# Patient Record
Sex: FEMALE | Race: WHITE | NOT HISPANIC OR LATINO | Employment: FULL TIME | URBAN - METROPOLITAN AREA
[De-identification: names, ages, dates, MRNs, and addresses within clinical notes are randomized per-mention and may not be internally consistent; named-entity substitution may affect disease eponyms.]

---

## 2018-01-21 ENCOUNTER — OFFICE VISIT (OUTPATIENT)
Dept: URGENT CARE | Facility: URGENT CARE | Age: 33
End: 2018-01-21
Payer: COMMERCIAL

## 2018-01-21 ENCOUNTER — NURSE TRIAGE (OUTPATIENT)
Dept: NURSING | Facility: CLINIC | Age: 33
End: 2018-01-21

## 2018-01-21 VITALS
TEMPERATURE: 99.4 F | WEIGHT: 164 LBS | BODY MASS INDEX: 22.21 KG/M2 | SYSTOLIC BLOOD PRESSURE: 147 MMHG | OXYGEN SATURATION: 97 % | HEIGHT: 72 IN | HEART RATE: 90 BPM | DIASTOLIC BLOOD PRESSURE: 90 MMHG

## 2018-01-21 DIAGNOSIS — R05.9 COUGH: ICD-10-CM

## 2018-01-21 DIAGNOSIS — J01.90 ACUTE SINUSITIS WITH SYMPTOMS > 10 DAYS: Primary | ICD-10-CM

## 2018-01-21 DIAGNOSIS — J98.01 ACUTE BRONCHOSPASM: ICD-10-CM

## 2018-01-21 PROCEDURE — 99204 OFFICE O/P NEW MOD 45 MIN: CPT | Performed by: FAMILY MEDICINE

## 2018-01-21 PROCEDURE — 87801 DETECT AGNT MULT DNA AMPLI: CPT | Performed by: FAMILY MEDICINE

## 2018-01-21 RX ORDER — AZITHROMYCIN 250 MG/1
TABLET, FILM COATED ORAL
Qty: 6 TABLET | Refills: 0 | Status: SHIPPED | OUTPATIENT
Start: 2018-01-21 | End: 2018-09-01

## 2018-01-21 RX ORDER — ALBUTEROL SULFATE 90 UG/1
2 AEROSOL, METERED RESPIRATORY (INHALATION) EVERY 6 HOURS PRN
Qty: 1 INHALER | Refills: 0 | Status: SHIPPED | OUTPATIENT
Start: 2018-01-21 | End: 2018-09-01

## 2018-01-21 NOTE — NURSING NOTE
"Chief Complaint   Patient presents with     Urgent Care     Pt in clinic c/o intermittent sore throat, fever, cough, congestion, chills, sinus pressure, and ear pain for 3 weeks.     Pharyngitis     Respiratory Problems     Fever     Sinus Problem     Ear Problem     Sweats       Initial /90  Pulse 90  Temp 99.4  F (37.4  C) (Tympanic)  Ht 6' 1\" (1.854 m)  Wt 164 lb (74.4 kg)  LMP 12/18/2017  SpO2 97%  BMI 21.64 kg/m2 Estimated body mass index is 21.64 kg/(m^2) as calculated from the following:    Height as of this encounter: 6' 1\" (1.854 m).    Weight as of this encounter: 164 lb (74.4 kg).  Medication Reconciliation: complete   Madelyn Franco/ MA    "

## 2018-01-21 NOTE — PATIENT INSTRUCTIONS
Okay to take ibuprofen 200 mg - 4 tablets (800 mg) every 8 hours as needed.  Okay to take tylenol 500 mg - 2 tablets (1000 mg) every 6-8 hours as needed, do not exceed 3000 mg in 24 hours.  Take full course of antibiotic.  Use albuterol inhaler to help with shortness of breath, wheezing, cough.  Okay to use steroid nasal spray to help with congestion.  Consider sudafed to help with sinus congestion and eustachian tube dysfunction.      Sinusitis (Antibiotic Treatment)    The sinuses are air-filled spaces within the bones of the face. They connect to the inside of the nose. Sinusitis is an inflammation of the tissue lining the sinus cavity. Sinus inflammation can occur during a cold. It can also be due to allergies to pollens and other particles in the air. Sinusitis can cause symptoms of sinus congestion and fullness. A sinus infection causes fever, headache and facial pain. There is often green or yellow drainage from the nose or into the back of the throat (post-nasal drip). You have been given antibiotics to treat this condition.  Home care:    Take the full course of antibiotics as instructed. Do not stop taking them, even if you feel better.    Drink plenty of water, hot tea, and other liquids. This may help thin mucus. It also may promote sinus drainage.    Heat may help soothe painful areas of the face. Use a towel soaked in hot water. Or,  the shower and direct the hot spray onto your face. Using a vaporizer along with a menthol rub at night may also help.     An expectorant containing guaifenesin may help thin the mucus and promote drainage from the sinuses.    Over-the-counter decongestants may be used unless a similar medicine was prescribed. Nasal sprays work the fastest. Use one that contains phenylephrine or oxymetazoline. First blow the nose gently. Then use the spray. Do not use these medicines more often than directed on the label or symptoms may get worse. You may also use tablets  containing pseudoephedrine. Avoid products that combine ingredients, because side effects may be increased. Read labels. You can also ask the pharmacist for help. (NOTE: Persons with high blood pressure should not use decongestants. They can raise blood pressure.)    Over-the-counter antihistamines may help if allergies contributed to your sinusitis.      Do not use nasal rinses or irrigation during an acute sinus infection, unless told to by your health care provider. Rinsing may spread the infection to other sinuses.    Use acetaminophen or ibuprofen to control pain, unless another pain medicine was prescribed. (If you have chronic liver or kidney disease or ever had a stomach ulcer, talk with your doctor before using these medicines. Aspirin should never be used in anyone under 18 years of age who is ill with a fever. It may cause severe liver damage.)    Don't smoke. This can worsen symptoms.  Follow-up care  Follow up with your healthcare provider or our staff if you are not improving within the next week.  When to seek medical advice  Call your healthcare provider if any of these occur:    Facial pain or headache becoming more severe    Stiff neck    Unusual drowsiness or confusion    Swelling of the forehead or eyelids    Vision problems, including blurred or double vision    Fever of 100.4 F (38 C) or higher, or as directed by your healthcare provider    Seizure    Breathing problems    Symptoms not resolving within 10 days  Date Last Reviewed: 4/13/2015 2000-2017 [employer]. [address] [place] [id number]. All rights reserved. This information is not intended as a substitute for professional medical care. Always follow your healthcare professional's instructions.        Bronchospasm (Adult)    Bronchospasm occurs when the airways (bronchial tubes) go into spasm and contract. This makes it hard to breathe and causes wheezing (a high-pitched whistling sound). Bronchospasm can also  cause frequent coughing without wheezing.  Bronchospasm is due to irritation, inflammation, or allergic reaction of the airways. People with asthma get bronchospasm. However, not everyone with bronchospasm has asthma.  Being exposed to harmful fumes, a recent case of bronchitis, exercise, or a flare-up of chronic obstructive pulmonary disease (COPD) may cause the airways to spasm. An episode of bronchospasm may last 7 to 14 days. Medicine may be prescribed to relax the airways and prevent wheezing. Antibiotics will be prescribed only if your healthcare provider thinks there is a bacterial infection. Antibiotics do not help a viral infection.  Home care    Drink lots of water or other fluids (at least 10 glasses a day) during an attack. This will loosen lung secretions and make it easier to breathe. If you have heart or kidney disease, check with your doctor before you drink extra fluids.    Take prescribed medicine exactly at the times advised. If you take an inhaled medicine to help with breathing, do not use it more than once every 4 hours, unless told to do so. If prescribed an antibiotic or prednisone, take all of the medicine, even if you are feeling better after a few days.    Do not smoke. Also avoid being exposed to secondhand smoke.    If you were given an inhaler, use it exactly as directed. If you need to use it more often than prescribed, your condition may be getting worse. Contact your healthcare provider.  Follow-up care  Follow up with your healthcare provider, or as advised.  Note: If you are age 65 or older, have a chronic lung disease or condition that affects your immune system, or you smoke, we recommend getting pneumococcal vaccinations, as well as an influenza vaccination (flu shot) every autumn. Ask your healthcare provider about this.  When to seek medical advice  Call your healthcare provider right away if any of these occur:    You need to use your inhalers more often than usual.    You  "develop a fever of 100.4 F (38 C) or higher.    You are coughing up lots of dark-colored sputum (mucus).    You do not start to improve within 24 hours.  Call 911, or get immediate medical care  Contact emergency services if any of these occur:    Coughing up bloody sputum (mucus)    Chest pain with each breath    Increased wheezing or shortness of breath  Date Last Reviewed: 9/13/2015 2000-2017 [employer]. [address] [place] [address]. All rights reserved. This information is not intended as a substitute for professional medical care. Always follow your healthcare professional's instructions.        Whooping Cough (Pertussis) (Adult)  Whooping cough (pertussis) is a bacterial infection in the respiratory tract. It can be a very serious infection in infants and older adults. In healthy older children and adults, it is generally mild.  Pertussis is highly contagious. The infection is spread through the air by coughing or sneezing. The illness starts like an ordinary cold with mild cough, congestion, and low fever. Symptoms then develop that may include:    Coughing spells that cause a \"whooping\" sound when breathing in    Gagging or vomiting after coughing    Poor appetite    Feeling very tired    Thick mucus in the nose and throat  Antibiotics are used to treat this illness. Even with treatment, it may take up to 3 months for the cough to go away completely.  Vaccination prevents pertussis in children. The vaccine effect lessens after 5 to 10 years. So a booster vaccine is often needed. Teens and adults who were vaccinated as children and have not had a booster can be infected and may spread the infection to unvaccinated infants and children. Be sure to ask your healthcare provider whether anyone in your household needs a booster vaccination.  Home Care    Take all medicine as prescribed by the healthcare provider. Be sure to take antibiotics as directed until they are gone, even if " you feel better. If you do not finish the antibiotics, the infection may come back and be harder to treat.    Rest and get plenty of sleep.    Stay home from work or school until you have completed at least 5 days of antibiotic treatment. If antibiotics are not used, stay home until 21 days after you first had symptoms of a cough. When resuming activity, go back to your normal routine gradually.    Avoid exposure to cigarette smoke.    Ask your healthcare provider before taking over-the-counter medicine for fever, pain, and coughing.    To avoid loss of fluids (dehydration), try drinking 6-8 glasses of fluids (water, juice, tea, soup) a day. Fluids will help loosen secretions in the nose and lungs.    Cover your mouth and nose when you sneeze or cough. Dispose of any tissues properly.    Wash your hands well with soap and water after you cough or sneeze, and frequently throughout the day.  Follow-up care  Follow up with your healthcare provider as advised.  When to seek medical advice  Call your healthcare provider right away for any of the following:    Trouble breathing or painful breathing    Cough with repeated gagging or vomiting    Coughing up colored or bloody mucus    Severe headache or face, neck, or ear pain    Fever over 100.4 F (38.0 C) for more than 3 days    You don't start improving within 1 week  Date Last Reviewed: 9/25/2015 2000-2017 [employer]. [address] [place] [address], [place] [address]. All rights reserved. This information is not intended as a substitute for professional medical care. Always follow your healthcare professional's instructions.

## 2018-01-21 NOTE — MR AVS SNAPSHOT
After Visit Summary   1/21/2018    Ivan Gilman    MRN: [id number]           Patient Information     Date Of Birth          1985        Visit Information        Provider Department      1/21/2018 10:25 AM Matt Gardiner MD [facility]        Today's Diagnoses     Acute sinusitis with symptoms > 10 days    -  1    Acute bronchospasm        Cough          Care Instructions    Okay to take ibuprofen 200 mg - 4 tablets (800 mg) every 8 hours as needed.  Okay to take tylenol 500 mg - 2 tablets (1000 mg) every 6-8 hours as needed, do not exceed 3000 mg in 24 hours.  Take full course of antibiotic.  Use albuterol inhaler to help with shortness of breath, wheezing, cough.  Okay to use steroid nasal spray to help with congestion.  Consider sudafed to help with sinus congestion and eustachian tube dysfunction.      Sinusitis (Antibiotic Treatment)    The sinuses are air-filled spaces within the bones of the face. They connect to the inside of the nose. Sinusitis is an inflammation of the tissue lining the sinus cavity. Sinus inflammation can occur during a cold. It can also be due to allergies to pollens and other particles in the air. Sinusitis can cause symptoms of sinus congestion and fullness. A sinus infection causes fever, headache and facial pain. There is often green or yellow drainage from the nose or into the back of the throat (post-nasal drip). You have been given antibiotics to treat this condition.  Home care:    Take the full course of antibiotics as instructed. Do not stop taking them, even if you feel better.    Drink plenty of water, hot tea, and other liquids. This may help thin mucus. It also may promote sinus drainage.    Heat may help soothe painful areas of the face. Use a towel soaked in hot water. Or,  the shower and direct the hot spray onto your face. Using a vaporizer along with a menthol rub at night may also help.     An expectorant containing  guaifenesin may help thin the mucus and promote drainage from the sinuses.    Over-the-counter decongestants may be used unless a similar medicine was prescribed. Nasal sprays work the fastest. Use one that contains phenylephrine or oxymetazoline. First blow the nose gently. Then use the spray. Do not use these medicines more often than directed on the label or symptoms may get worse. You may also use tablets containing pseudoephedrine. Avoid products that combine ingredients, because side effects may be increased. Read labels. You can also ask the pharmacist for help. (NOTE: Persons with high blood pressure should not use decongestants. They can raise blood pressure.)    Over-the-counter antihistamines may help if allergies contributed to your sinusitis.      Do not use nasal rinses or irrigation during an acute sinus infection, unless told to by your health care provider. Rinsing may spread the infection to other sinuses.    Use acetaminophen or ibuprofen to control pain, unless another pain medicine was prescribed. (If you have chronic liver or kidney disease or ever had a stomach ulcer, talk with your doctor before using these medicines. Aspirin should never be used in anyone under 18 years of age who is ill with a fever. It may cause severe liver damage.)    Don't smoke. This can worsen symptoms.  Follow-up care  Follow up with your healthcare provider or our staff if you are not improving within the next week.  When to seek medical advice  Call your healthcare provider if any of these occur:    Facial pain or headache becoming more severe    Stiff neck    Unusual drowsiness or confusion    Swelling of the forehead or eyelids    Vision problems, including blurred or double vision    Fever of 100.4 F (38 C) or higher, or as directed by your healthcare provider    Seizure    Breathing problems    Symptoms not resolving within 10 days  Date Last Reviewed: 4/13/2015 2000-2017 [employer] [address]  [place] [address]. All rights reserved. This information is not intended as a substitute for professional medical care. Always follow your healthcare professional's instructions.        Bronchospasm (Adult)    Bronchospasm occurs when the airways (bronchial tubes) go into spasm and contract. This makes it hard to breathe and causes wheezing (a high-pitched whistling sound). Bronchospasm can also cause frequent coughing without wheezing.  Bronchospasm is due to irritation, inflammation, or allergic reaction of the airways. People with asthma get bronchospasm. However, not everyone with bronchospasm has asthma.  Being exposed to harmful fumes, a recent case of bronchitis, exercise, or a flare-up of chronic obstructive pulmonary disease (COPD) may cause the airways to spasm. An episode of bronchospasm may last 7 to 14 days. Medicine may be prescribed to relax the airways and prevent wheezing. Antibiotics will be prescribed only if your healthcare provider thinks there is a bacterial infection. Antibiotics do not help a viral infection.  Home care    Drink lots of water or other fluids (at least 10 glasses a day) during an attack. This will loosen lung secretions and make it easier to breathe. If you have heart or kidney disease, check with your doctor before you drink extra fluids.    Take prescribed medicine exactly at the times advised. If you take an inhaled medicine to help with breathing, do not use it more than once every 4 hours, unless told to do so. If prescribed an antibiotic or prednisone, take all of the medicine, even if you are feeling better after a few days.    Do not smoke. Also avoid being exposed to secondhand smoke.    If you were given an inhaler, use it exactly as directed. If you need to use it more often than prescribed, your condition may be getting worse. Contact your healthcare provider.  Follow-up care  Follow up with your healthcare provider, or as advised.  Note: If you  "are age 65 or older, have a chronic lung disease or condition that affects your immune system, or you smoke, we recommend getting pneumococcal vaccinations, as well as an influenza vaccination (flu shot) every autumn. Ask your healthcare provider about this.  When to seek medical advice  Call your healthcare provider right away if any of these occur:    You need to use your inhalers more often than usual.    You develop a fever of 100.4 F (38 C) or higher.    You are coughing up lots of dark-colored sputum (mucus).    You do not start to improve within 24 hours.  Call 911, or get immediate medical care  Contact emergency services if any of these occur:    Coughing up bloody sputum (mucus)    Chest pain with each breath    Increased wheezing or shortness of breath  Date Last Reviewed: 9/13/2015 2000-2017 [employer] [address] [place] [address]. All rights reserved. This information is not intended as a substitute for professional medical care. Always follow your healthcare professional's instructions.        Whooping Cough (Pertussis) (Adult)  Whooping cough (pertussis) is a bacterial infection in the respiratory tract. It can be a very serious infection in infants and older adults. In healthy older children and adults, it is generally mild.  Pertussis is highly contagious. The infection is spread through the air by coughing or sneezing. The illness starts like an ordinary cold with mild cough, congestion, and low fever. Symptoms then develop that may include:    Coughing spells that cause a \"whooping\" sound when breathing in    Gagging or vomiting after coughing    Poor appetite    Feeling very tired    Thick mucus in the nose and throat  Antibiotics are used to treat this illness. Even with treatment, it may take up to 3 months for the cough to go away completely.  Vaccination prevents pertussis in children. The vaccine effect lessens after 5 to 10 years. So a booster vaccine is " often needed. Teens and adults who were vaccinated as children and have not had a booster can be infected and may spread the infection to unvaccinated infants and children. Be sure to ask your healthcare provider whether anyone in your household needs a booster vaccination.  Home Care    Take all medicine as prescribed by the healthcare provider. Be sure to take antibiotics as directed until they are gone, even if you feel better. If you do not finish the antibiotics, the infection may come back and be harder to treat.    Rest and get plenty of sleep.    Stay home from work or school until you have completed at least 5 days of antibiotic treatment. If antibiotics are not used, stay home until 21 days after you first had symptoms of a cough. When resuming activity, go back to your normal routine gradually.    Avoid exposure to cigarette smoke.    Ask your healthcare provider before taking over-the-counter medicine for fever, pain, and coughing.    To avoid loss of fluids (dehydration), try drinking 6-8 glasses of fluids (water, juice, tea, soup) a day. Fluids will help loosen secretions in the nose and lungs.    Cover your mouth and nose when you sneeze or cough. Dispose of any tissues properly.    Wash your hands well with soap and water after you cough or sneeze, and frequently throughout the day.  Follow-up care  Follow up with your healthcare provider as advised.  When to seek medical advice  Call your healthcare provider right away for any of the following:    Trouble breathing or painful breathing    Cough with repeated gagging or vomiting    Coughing up colored or bloody mucus    Severe headache or face, neck, or ear pain    Fever over 100.4 F (38.0 C) for more than 3 days    You don't start improving within 1 week  Date Last Reviewed: 9/25/2015 2000-2017 [employer]. [address] [place] [address], [place] [address]. All rights reserved. This information is not intended as a substitute for  "professional medical care. Always follow your healthcare professional's instructions.                Follow-ups after your visit        Who to contact     If you have questions or need follow up information about today's clinic visit or your schedule please contact [facility] directly at [phone].  Normal or non-critical lab and imaging results will be communicated to you by MyChart, letter or phone within 4 business days after the clinic has received the results. If you do not hear from us within 7 days, please contact the clinic through [employer] or phone. If you have a critical or abnormal lab result, we will notify you by phone as soon as possible.  Submit refill requests through [employer] or call your pharmacy and they will forward the refill request to us. Please allow 3 business days for your refill to be completed.          Additional Information About Your Visit        MyChart Information     [employer] lets you send messages to your doctor, view your test results, renew your prescriptions, schedule appointments and more. To sign up, go to [email or web address] . Click on \"Log in\" on the left side of the screen, which will take you to the Welcome page. Then click on \"Sign up Now\" on the right side of the page.     You will be asked to enter the access code listed below, as well as some personal information. Please follow the directions to create your username and password.     Your access code is: [email or web address]  Expires: 2018 11:51 AM     Your access code will  in 90 days. If you need help or a new code, please call your [facility] clinic or [phone].        Care EveryWhere ID     This is your Care EveryWhere ID. This could be used by other organizations to access your [facility] medical records  [id number]        Your Vitals Were     Pulse Temperature Height Last Period Pulse Oximetry BMI (Body Mass Index)    90 99.4  F (37.4  C) (Tympanic) 6' 1\" (1.854 m) 2017 97% " 21.64 kg/m2       Blood Pressure from Last 3 Encounters:   01/21/18 147/90    Weight from Last 3 Encounters:   01/21/18 164 lb (74.4 kg)              We Performed the Following     Bordetella per/paraper PCR          Today's Medication Changes          These changes are accurate as of: 1/21/18 11:51 AM.  If you have any questions, ask your nurse or doctor.               Start taking these medicines.        Dose/Directions    albuterol 108 (90 BASE) MCG/ACT Inhaler   Commonly known as:  PROAIR HFA/PROVENTIL HFA/VENTOLIN HFA   Used for:  Acute bronchospasm   Started by:  Matt Gardiner MD        Dose:  2 puff   Inhale 2 puffs into the lungs every 6 hours as needed for shortness of breath / dyspnea or wheezing   Quantity:  1 Inhaler   Refills:  0       azithromycin 250 MG tablet   Commonly known as:  ZITHROMAX   Used for:  Acute sinusitis with symptoms > 10 days   Started by:  Matt Gardiner MD        Two tablets first day, then one tablet daily for four days.   Quantity:  6 tablet   Refills:  0            Where to get your medicines      These medications were sent to [facility] - [place], [place] - [address] AT [facility]  [address], [place] [address]     Phone:  [phone]     albuterol 108 (90 BASE) MCG/ACT Inhaler    azithromycin 250 MG tablet                Primary Care Provider Fax #    Provider Not In System [phone]                Equal Access to Services     [facility] PAUL AH: Hadii kassidy prasad hadbeauo Sokim, waaxda luqadaha, qaybta kaalmada adeegyada, noemy peter . So [facility] [place] [facility] [phone].    ATENCIÓN: Si habla español, tiene a briones disposición servicios gratuitos de asistencia lingüística. Llame al [phone].    We comply with applicable federal civil rights laws and [place] laws. We do not discriminate on the basis of race, color, national origin, age, disability, sex, sexual orientation, or gender identity.            Thank you!     Thank you  for choosing [facility]  for your care. Our goal is always to provide you with excellent care. Hearing back from our patients is one way we can continue to improve our services. Please take a few minutes to complete the written survey that you may receive in the mail after your visit with us. Thank you!             Your Updated Medication List - Protect others around you: Learn how to safely use, store and throw away your medicines at www.disposemymeds.org.          This list is accurate as of: 1/21/18 11:51 AM.  Always use your most recent med list.                   Brand Name Dispense Instructions for use Diagnosis    albuterol 108 (90 BASE) MCG/ACT Inhaler    PROAIR HFA/PROVENTIL HFA/VENTOLIN HFA    1 Inhaler    Inhale 2 puffs into the lungs every 6 hours as needed for shortness of breath / dyspnea or wheezing    Acute bronchospasm       azithromycin 250 MG tablet    ZITHROMAX    6 tablet    Two tablets first day, then one tablet daily for four days.    Acute sinusitis with symptoms > 10 days       levonorgestrel 20 MCG/24HR IUD    MIRENA     1 each by Intrauterine route once

## 2018-01-21 NOTE — PROGRESS NOTES
"SUBJECTIVE:   Ivan Gilman is a 32 year old female presenting with a chief complaint of fever, sore throat, cough, congestion, chills, sinus pressure and ear pain.  Thinks that had flu initially but did not come in.  Symptoms settling more in chest.  Now having purulent rhinitis.  Had mono when 20 years old.  Onset of symptoms was 3 week(s) ago.  Course of illness is worsening.    Severity moderate  Current and Associated symptoms: sinus pressure, sore throat, cough  Treatment measures tried include Tylenol/Ibuprofen, Decongestants, Antihistamine, Fluids and Rest.  Predisposing factors include None.    Last tetanus about 8 years ago.    No TOB use, no history of asthma.    Family history - negative for asthma.    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    No past medical history on file.  Current Outpatient Prescriptions   Medication Sig Dispense Refill     levonorgestrel (MIRENA) 20 MCG/24HR IUD 1 each by Intrauterine route once       Social History   Substance Use Topics     Smoking status: Never Smoker     Smokeless tobacco: Never Used     Alcohol use Not on file       ROS:  CONSTITUTIONAL:POSITIVE  for chills, fatigue, fever, malaise and myalgias  INTEGUMENTARY/SKIN: NEGATIVE for worrisome rashes, moles or lesions  ENT/MOUTH: POSITIVE for ear pain, nasal congestion, rhinorrhea-purulent, sinus pressure and sore throat  RESP:POSITIVE for cough-non productive and SOB/dyspnea  CV: NEGATIVE for chest pain, palpitations or peripheral edema  GI: NEGATIVE for nausea, abdominal pain, heartburn, or change in bowel habits  MUSCULOSKELETAL: NEGATIVE for significant arthralgias or myalgia    OBJECTIVE:  /90  Pulse 90  Temp 99.4  F (37.4  C) (Tympanic)  Ht 6' 1\" (1.854 m)  Wt 164 lb (74.4 kg)  LMP 12/18/2017  SpO2 97%  BMI 21.64 kg/m2  GENERAL APPEARANCE: healthy, alert and no distress  EYES: EOMI,  PERRL, conjunctiva clear  HENT: ear canals and TM's normal.  Nose and " mouth without ulcers, erythema or lesions.  Mild sinus tenderness on percussion.  Pharynx pink, no exudates  NECK: supple, nontender, no lymphadenopathy  RESP: lungs clear to auscultation - no rales, rhonchi or wheezes  CV: regular rates and rhythm, normal S1 S2, no murmur noted  NEURO: Normal strength and tone, sensory exam grossly normal,  normal speech and mentation  SKIN: no suspicious lesions or rashes  PSYCH: mentation appears normal and affect normal/bright    ASSESSMENT/PLAN:  (J01.90) Acute sinusitis with symptoms > 10 days  (primary encounter diagnosis)  Plan: azithromycin (ZITHROMAX) 250 MG tablet            (J98.01) Acute bronchospasm  Plan: albuterol (PROAIR HFA/PROVENTIL HFA/VENTOLIN         HFA) 108 (90 BASE) MCG/ACT Inhaler            (R05) Cough  Comment: prolong cough  Plan: Bordetella per/paraper PCR            Reassurance given, reviewed symptomatic treatment, plenty of fluids and rest.  RX Zpak given for sinus infection due to prolong symptoms and worsening.  Okay for albuterol inhaler to try to help with cough and mild SOB symptoms.  Will screen for pertussis due to prolong symptoms, reviewed that will notify if positive as cough will be prolong for several months, reviewed that Zpak will already cover for pertussis.    Encourage to establish with primary provider for routine care.  Return to clinic if no resolution of symptoms.    Matt Gardiner MD  January 21, 2018 12:01 PM

## 2018-01-21 NOTE — TELEPHONE ENCOUNTER
Patient at pharmacy to  prescription for azithromycin that was E-Prescribed; pharmacy says never received.  [facility]NA called prescription to [facility] [id number] per [facility].

## 2018-01-23 LAB
B PERT+PARAPERT DNA PNL SPEC NAA+PROBE: NEGATIVE
SPECIMEN SOURCE: NORMAL

## 2018-09-01 ENCOUNTER — APPOINTMENT (OUTPATIENT)
Dept: CT IMAGING | Facility: CLINIC | Age: 33
End: 2018-09-01
Attending: PHYSICIAN ASSISTANT
Payer: COMMERCIAL

## 2018-09-01 ENCOUNTER — HOSPITAL ENCOUNTER (OUTPATIENT)
Facility: CLINIC | Age: 33
Setting detail: OBSERVATION
Discharge: HOME | End: 2018-09-02
Attending: PHYSICIAN ASSISTANT | Admitting: OBSTETRICS & GYNECOLOGY
Payer: COMMERCIAL

## 2018-09-01 ENCOUNTER — APPOINTMENT (OUTPATIENT)
Dept: ULTRASOUND IMAGING | Facility: CLINIC | Age: 33
End: 2018-09-01
Attending: PHYSICIAN ASSISTANT
Payer: COMMERCIAL

## 2018-09-01 DIAGNOSIS — N83.209 OVARIAN CYST RUPTURE: ICD-10-CM

## 2018-09-01 DIAGNOSIS — K66.1 HEMOPERITONEUM: ICD-10-CM

## 2018-09-01 LAB
ALBUMIN SERPL-MCNC: 4.2 G/DL (ref 3.4–5)
ALBUMIN UR-MCNC: 10 MG/DL
ALP SERPL-CCNC: 48 U/L (ref 40–150)
ALT SERPL W P-5'-P-CCNC: 16 U/L (ref 0–50)
ANION GAP SERPL CALCULATED.3IONS-SCNC: 9 MMOL/L (ref 3–14)
APPEARANCE UR: ABNORMAL
AST SERPL W P-5'-P-CCNC: 15 U/L (ref 0–45)
BACTERIA #/AREA URNS HPF: ABNORMAL /HPF
BASOPHILS # BLD AUTO: 0 10E9/L (ref 0–0.2)
BASOPHILS NFR BLD AUTO: 0.4 %
BILIRUB SERPL-MCNC: 1.4 MG/DL (ref 0.2–1.3)
BILIRUB UR QL STRIP: NEGATIVE
BUN SERPL-MCNC: 9 MG/DL (ref 7–30)
CALCIUM SERPL-MCNC: 8.7 MG/DL (ref 8.5–10.1)
CHLORIDE SERPL-SCNC: 106 MMOL/L (ref 94–109)
CO2 SERPL-SCNC: 25 MMOL/L (ref 20–32)
COLOR UR AUTO: YELLOW
CREAT SERPL-MCNC: 0.82 MG/DL (ref 0.52–1.04)
DIFFERENTIAL METHOD BLD: ABNORMAL
EOSINOPHIL # BLD AUTO: 0 10E9/L (ref 0–0.7)
EOSINOPHIL NFR BLD AUTO: 0.2 %
ERYTHROCYTE [DISTWIDTH] IN BLOOD BY AUTOMATED COUNT: 12.7 % (ref 10–15)
ERYTHROCYTE [DISTWIDTH] IN BLOOD BY AUTOMATED COUNT: 12.8 % (ref 10–15)
GFR SERPL CREATININE-BSD FRML MDRD: 81 ML/MIN/1.7M2
GLUCOSE SERPL-MCNC: 86 MG/DL (ref 70–99)
GLUCOSE UR STRIP-MCNC: NEGATIVE MG/DL
HCG UR QL: NEGATIVE
HCT VFR BLD AUTO: 27.5 % (ref 35–47)
HCT VFR BLD AUTO: 32.1 % (ref 35–47)
HGB BLD-MCNC: 10 G/DL (ref 11.7–15.7)
HGB BLD-MCNC: 10.5 G/DL (ref 11.7–15.7)
HGB BLD-MCNC: 11.8 G/DL (ref 11.7–15.7)
HGB UR QL STRIP: ABNORMAL
IMM GRANULOCYTES # BLD: 0 10E9/L (ref 0–0.4)
IMM GRANULOCYTES NFR BLD: 0.2 %
KETONES UR STRIP-MCNC: 40 MG/DL
LEUKOCYTE ESTERASE UR QL STRIP: ABNORMAL
LIPASE SERPL-CCNC: 83 U/L (ref 73–393)
LYMPHOCYTES # BLD AUTO: 1.9 10E9/L (ref 0.8–5.3)
LYMPHOCYTES NFR BLD AUTO: 34.4 %
MCH RBC QN AUTO: 33.3 PG (ref 26.5–33)
MCH RBC QN AUTO: 33.4 PG (ref 26.5–33)
MCHC RBC AUTO-ENTMCNC: 36.4 G/DL (ref 31.5–36.5)
MCHC RBC AUTO-ENTMCNC: 36.8 G/DL (ref 31.5–36.5)
MCV RBC AUTO: 91 FL (ref 78–100)
MCV RBC AUTO: 92 FL (ref 78–100)
MONOCYTES # BLD AUTO: 0.6 10E9/L (ref 0–1.3)
MONOCYTES NFR BLD AUTO: 10.2 %
MUCOUS THREADS #/AREA URNS LPF: PRESENT /LPF
NEUTROPHILS # BLD AUTO: 3.1 10E9/L (ref 1.6–8.3)
NEUTROPHILS NFR BLD AUTO: 54.6 %
NITRATE UR QL: NEGATIVE
NRBC # BLD AUTO: 0 10*3/UL
NRBC BLD AUTO-RTO: 0 /100
PH UR STRIP: 6 PH (ref 5–7)
PLATELET # BLD AUTO: 156 10E9/L (ref 150–450)
PLATELET # BLD AUTO: 194 10E9/L (ref 150–450)
POTASSIUM SERPL-SCNC: 3.5 MMOL/L (ref 3.4–5.3)
PROT SERPL-MCNC: 7 G/DL (ref 6.8–8.8)
RBC # BLD AUTO: 3 10E12/L (ref 3.8–5.2)
RBC # BLD AUTO: 3.53 10E12/L (ref 3.8–5.2)
RBC #/AREA URNS AUTO: 3 /HPF (ref 0–2)
SODIUM SERPL-SCNC: 140 MMOL/L (ref 133–144)
SOURCE: ABNORMAL
SP GR UR STRIP: 1.02 (ref 1–1.03)
SQUAMOUS #/AREA URNS AUTO: 6 /HPF (ref 0–1)
UROBILINOGEN UR STRIP-MCNC: NORMAL MG/DL (ref 0–2)
WBC # BLD AUTO: 4.8 10E9/L (ref 4–11)
WBC # BLD AUTO: 5.6 10E9/L (ref 4–11)
WBC #/AREA URNS AUTO: 4 /HPF (ref 0–5)

## 2018-09-01 PROCEDURE — 85018 HEMOGLOBIN: CPT | Performed by: PHYSICIAN ASSISTANT

## 2018-09-01 PROCEDURE — 85027 COMPLETE CBC AUTOMATED: CPT | Performed by: OBSTETRICS & GYNECOLOGY

## 2018-09-01 PROCEDURE — 81025 URINE PREGNANCY TEST: CPT | Performed by: PHYSICIAN ASSISTANT

## 2018-09-01 PROCEDURE — 83690 ASSAY OF LIPASE: CPT | Performed by: PHYSICIAN ASSISTANT

## 2018-09-01 PROCEDURE — 25000128 H RX IP 250 OP 636: Performed by: PHYSICIAN ASSISTANT

## 2018-09-01 PROCEDURE — 36415 COLL VENOUS BLD VENIPUNCTURE: CPT | Performed by: OBSTETRICS & GYNECOLOGY

## 2018-09-01 PROCEDURE — 25000125 ZZHC RX 250: Performed by: PHYSICIAN ASSISTANT

## 2018-09-01 PROCEDURE — 96361 HYDRATE IV INFUSION ADD-ON: CPT

## 2018-09-01 PROCEDURE — 81001 URINALYSIS AUTO W/SCOPE: CPT | Performed by: PHYSICIAN ASSISTANT

## 2018-09-01 PROCEDURE — 76830 TRANSVAGINAL US NON-OB: CPT

## 2018-09-01 PROCEDURE — 85025 COMPLETE CBC W/AUTO DIFF WBC: CPT | Performed by: PHYSICIAN ASSISTANT

## 2018-09-01 PROCEDURE — 99285 EMERGENCY DEPT VISIT HI MDM: CPT | Mod: 25

## 2018-09-01 PROCEDURE — 74177 CT ABD & PELVIS W/CONTRAST: CPT

## 2018-09-01 PROCEDURE — 80053 COMPREHEN METABOLIC PANEL: CPT | Performed by: PHYSICIAN ASSISTANT

## 2018-09-01 PROCEDURE — 96374 THER/PROPH/DIAG INJ IV PUSH: CPT | Mod: 59

## 2018-09-01 RX ORDER — KETOROLAC TROMETHAMINE 15 MG/ML
15 INJECTION, SOLUTION INTRAMUSCULAR; INTRAVENOUS ONCE
Status: COMPLETED | OUTPATIENT
Start: 2018-09-01 | End: 2018-09-01

## 2018-09-01 RX ORDER — IBUPROFEN 600 MG/1
600 TABLET, FILM COATED ORAL EVERY 6 HOURS PRN
Status: DISCONTINUED | OUTPATIENT
Start: 2018-09-01 | End: 2018-09-02 | Stop reason: HOSPADM

## 2018-09-01 RX ORDER — SODIUM CHLORIDE 9 MG/ML
INJECTION, SOLUTION INTRAVENOUS CONTINUOUS
Status: DISCONTINUED | OUTPATIENT
Start: 2018-09-01 | End: 2018-09-02 | Stop reason: HOSPADM

## 2018-09-01 RX ORDER — IOPAMIDOL 755 MG/ML
83 INJECTION, SOLUTION INTRAVASCULAR ONCE
Status: COMPLETED | OUTPATIENT
Start: 2018-09-01 | End: 2018-09-01

## 2018-09-01 RX ORDER — SODIUM CHLORIDE 9 MG/ML
1000 INJECTION, SOLUTION INTRAVENOUS CONTINUOUS
Status: DISCONTINUED | OUTPATIENT
Start: 2018-09-01 | End: 2018-09-02 | Stop reason: HOSPADM

## 2018-09-01 RX ADMIN — SODIUM CHLORIDE 1000 ML: 9 INJECTION, SOLUTION INTRAVENOUS at 21:19

## 2018-09-01 RX ADMIN — KETOROLAC TROMETHAMINE 15 MG: 15 INJECTION, SOLUTION INTRAMUSCULAR; INTRAVENOUS at 15:48

## 2018-09-01 RX ADMIN — SODIUM CHLORIDE, PRESERVATIVE FREE 65 ML: 5 INJECTION INTRAVENOUS at 16:19

## 2018-09-01 RX ADMIN — SODIUM CHLORIDE 1000 ML: 9 INJECTION, SOLUTION INTRAVENOUS at 18:09

## 2018-09-01 RX ADMIN — IOPAMIDOL 83 ML: 755 INJECTION, SOLUTION INTRAVENOUS at 16:18

## 2018-09-01 RX ADMIN — SODIUM CHLORIDE 1000 ML: 9 INJECTION, SOLUTION INTRAVENOUS at 15:40

## 2018-09-01 ASSESSMENT — ENCOUNTER SYMPTOMS
DIARRHEA: 0
CHILLS: 0
FEVER: 0
VOMITING: 0
ABDOMINAL PAIN: 1
CONSTIPATION: 1
ABDOMINAL DISTENTION: 1
FLANK PAIN: 0
DYSURIA: 0
HEMATURIA: 0

## 2018-09-01 ASSESSMENT — PAIN DESCRIPTION - DESCRIPTORS: DESCRIPTORS: ACHING

## 2018-09-01 NOTE — ED NOTES
Bed: ED10  Expected date:   Expected time:   Means of arrival:   Comments:  Triage abdominal pain

## 2018-09-01 NOTE — ED PROVIDER NOTES
"Emergency Department Attending Supervision Note  9/1/2018  5:07 PM      I evaluated this patient in conjunction with Indira Gooden PA-C    Briefly, the patient presented with multiple days of abdominal pain.  History of prior fallopian tube cyst requiring surgery.  No urinary symptoms or vomiting.  No fevers.  Is not on blood thinners.    On my exam, her abdomen is soft and without peritonitis.  CT shows suspicion for hemoperitoneum with abnormality around the left ovary from possible hemorrhagic cyst.  Ultrasound shows similar findings.  No evidence of ovarian torsion.  Initial hemoglobin was 11.8, which decreased to 10 point 1:05 liter of IV fluid and several hours in the emergency department.  We spoke with her on-call gynecologist recommended hospitalization for serial hemoglobin's and close monitoring.    My impression is that her symptoms are likely from a ruptured ovarian cyst causing hemoperitoneum.  She was initially mildly tachycardic though this is improved.  No indication for immediate transfusion though she will be watched closely here in the hospital.  Surgical intervention can be reconsidered pending her clinical course and serial lab values.  Patient was notified of these recommendations and plan and agrees with it.      Diagnosis    ICD-10-CM    1. Ovarian cyst rupture N83.209    2.      Hemoperitoneum  3.       Acute blood loss anemia    I, Edwina Person, am serving as a scribe on 9/1/2018 at 5:08 PM to personally document services performed by Dr. Rhoades based on the provider's statements to me.         This record was created at least in part using electronic voice recognition software, so please excuse any \"typos.\"       Neo Rhoades MD  09/01/18 1857    "

## 2018-09-01 NOTE — IP AVS SNAPSHOT
"                  MRN:[id number]                      After Visit Summary   9/1/2018    Ivan Gilman    MRN: [id number]           Thank you!     Thank you for choosing [facility] for your care. Our goal is always to provide you with excellent care. Hearing back from our patients is one way we can continue to improve our services. Please take a few minutes to complete the written survey that you may receive in the mail after you visit with us. Thank you!        Patient Information     Date Of Birth          1985        About your hospital stay     You were admitted on:  September 2, 2018 You last received care in the:  [facility]    You were discharged on:  September 2, 2018       Who to Call     For medical emergencies, please call 911.  For non-urgent questions about your medical care, please call your primary care provider or clinic, None          Attending Provider     Provider Specialty    Indira Gooden PA-C Physician Assistant    Patrice Garcia MD OB/Gyn       Primary Care Provider Fax #    Physician No Ref-Primary [phone]      Further instructions from your care team       Follow up with Dr. Garcia's office in 4-6 weeks for ultrasound and exam.    [facility]  [address], [address]  [place] [address]    [phone]    Pending Results     No orders found for last 3 day(s).            Statement of Approval     Ordered          09/02/18 0959  I have reviewed and agree with all the recommendations and orders detailed in this document.  EFFECTIVE NOW     Approved and electronically signed by:  Patrice Garcia MD             Admission Information     Date & Time Provider Department Dept. Phone    9/1/2018 Patrice Garcia MD [facility] [phone]      Your Vitals Were     Blood Pressure Temperature Respirations Height Weight Last Period    108/54 (BP Location: Right arm) 97  F (36.1  C) (Oral) 16 1.854 m (6' 1\") 75.8 kg (167 lb " "3.2 oz) 2018    Pulse Oximetry BMI (Body Mass Index)                97% 22.06 kg/m2          Anyvite Information     Anyvite lets you send messages to your doctor, view your test results, renew your prescriptions, schedule appointments and more. To sign up, go to [email or web address] . Click on \"Log in\" on the left side of the screen, which will take you to the Welcome page. Then click on \"Sign up Now\" on the right side of the page.     You will be asked to enter the access code listed below, as well as some personal information. Please follow the directions to create your username and password.     Your access code is: [email or web address]  Expires: 2018 10:22 AM     Your access code will  in 90 days. If you need help or a new code, please call your [facility] clinic or [phone].        Care EveryWhere ID     This is your Care EveryWhere ID. This could be used by other organizations to access your [facility] medical records  [id number]        Equal Access to Services     [facility]: Hadii kassidy Landrum, wajulianada jose david, qaybyusuf kaalbennie griggs, noemy peter . So [facility] [phone].    ATENCIÓN: Si habla español, tiene a briones disposición servicios gratuitos de asistencia lingüística. Llame al [phone].    We comply with applicable federal civil rights laws and [place] laws. We do not discriminate on the basis of race, color, national origin, age, disability, sex, sexual orientation, or gender identity.               Review of your medicines      CONTINUE these medicines which have NOT CHANGED        Dose / Directions    levonorgestrel 20 MCG/24HR IUD   Commonly known as:  MIRENA   Notes to Patient:  Resume taking as you do at home          Dose:  1 each   1 each by Intrauterine route once   Refills:  0                Protect others around you: Learn how to safely use, store and throw away your medicines at www.disposemymeds.org.             Medication List: " This is a list of all your medications and when to take them. Check marks below indicate your daily home schedule. Keep this list as a reference.      Medications           Morning Afternoon Evening Bedtime As Needed    levonorgestrel 20 MCG/24HR IUD   Commonly known as:  MIRENA   1 each by Intrauterine route once   Notes to Patient:  Resume taking as you do at home

## 2018-09-01 NOTE — ED PROVIDER NOTES
"  History     Chief Complaint:  Abdominal Pain    HPI   Ivan Gilman is a 33 year old female who presents with abdominal pain. Patient reports abdominal pain and intermittent abdominal bloating for the last 5 days. She states that she often gets constipated for no particular reason. She did not have a bowel movement for 2 days, however she has had bowel movements that last 2 days. Patient states that 2 nights ago at bed time she had a sharp abdominal pain with swelling again. Last night she had the same severe abdominal pain, but she also had an \"electrical shock\" type feeling in her shoulders that shot down her back. She currently reports her abdominal pain at a 2-3/10. She is able to keep food down, however eating makes the pain worse. Movement also makes the pain worse. She denies any fevers, vomiting, or diarrhea. No urinary symptoms. Patient reports she should be getting her period in the next few days.     Allergies:  No known drug allergies.    Medications:    Albuterol (proair hfa/proventil hfa/ventolin hfa) 108 (90 base) mcg/act inhaler  Azithromycin (zithromax) 250 mg tablet  Levonorgestrel (mirena) 20 mcg/24hr iud     Past Medical History:    History reviewed.  No significant past medical history.     Past Surgical History:    Abdominal cyst removal    Family History:    History reviewed. No pertinent family history.    Social History:  Marital Status:   Presents to the ED with family   Tobacco Use: No     Review of Systems   Constitutional: Negative for chills and fever.   Gastrointestinal: Positive for abdominal distention, abdominal pain and constipation. Negative for diarrhea and vomiting.   Genitourinary: Negative for dysuria, flank pain and hematuria.   Neurological:        \"electrical shock\" feeling in shoulders and back   All other systems reviewed and are negative.    Physical Exam   First Vitals:  Patient Vitals for the past 24 hrs:   BP Temp Temp src Heart Rate Resp SpO2 Height Weight " "  09/01/18 1900 128/70 - - - - 99 % - -   09/01/18 1830 122/61 - - - - 100 % - -   09/01/18 1800 121/67 - - - - 99 % - -   09/01/18 1738 - - - - - 99 % - -   09/01/18 1737 129/74 - - - - - - -   09/01/18 1700 144/42 - - - - 100 % - -   09/01/18 1520 (!) 139/100 97.8  F (36.6  C) Oral 110 18 100 % 1.854 m (6' 1\") 74.8 kg (165 lb)       Physical Exam  General: Alert, interactive. GCS 15. No acute distress.   Head:  Scalp is atraumatic.  Eyes:  EOM intact. The pupils are equal, round, and reactive to light. No scleral icterus.  ENT:                                      Ears:  The external ears are normal.  Nose:  The external nose is normal.  Throat:  The oropharynx is normal. Mucus membranes are dry.                 Neck:  Normal range of motion. There is no rigidity.   CV:  Regular rate and rhythm. No murmur. 2+ radial pulses  Resp:  Breath sounds are clear bilaterally. Non-labored, no retractions or accessory muscle use.  GI:  Abdomen is soft, no distension, no localized tenderness, rebound or guarding.  No McBurney's point tenderness.  No suprapubic tenderness. No CVA tenderness.    MS:  Normal range of motion.   Skin:  Warm and dry.   Neuro:  Strength and sensation grossly intact.   Psych:  Awake. Alert.  Appropriate interactions.     Emergency Department Course     Imaging:  Radiographic findings were communicated with the patient and family who voiced understanding of the findings.  CT abdomen pelvis w contrast:  1. Small volume but increased density ascites at the right flank  worrisome for blood products.  2. Three cystic lesions at the left ovary/adnexa, largest measuring  5.3 cm are noted. These may be ovarian in origin. Hemorrhagic cysts  are a possibility.  3. Please note that the appendix cannot be visualized and therefore  cannot be assessed. Appendicitis cannot be excluded based on this  examination. If symptoms progress, repeat CT should be considered. Per Radiology read.    US pelvis complete w " transvaginal and abd/pelivs duplex:  1. Three left ovarian cystic lesions. One of these appears complex and  could be a hemorrhagic cyst that is 3.7 cm.  2. Echogenic free fluid in the pelvis suggesting blood products.  3. IUD appears appropriate.  4. Color and Doppler spectral analysis is within normal limits.  Ovarian torsion cannot be excluded based on this examination alone. Per Radiology read.    Laboratory:  HCG: Negative  UA: Ketone 40 (A), Blood trace (A), Leukocyte Esterase large (A), RBC 3 high, Bacteria few (A), Squamous Epithelial 6 high, Mucous present (A), o/w WNL   CBC(1537):  WBC 5.6, HGB 11.8,   CMP: Bilirubin Total 1.4 high, o/w WNL. (Creatinine 0.82)  Lipase:83  Hemoglobin(1809): 10.5 low    Interventions:  1540: NS 1L IV  1548: Toradol 15mg IV    Emergency Department Course:  Nursing notes and vitals reviewed.  I performed an exam of the patient as documented above.   6:34 PM I consulted with Dr. Garcia of OB/GYN regarding the patient. He will admit the patient to an observation bed for further monitoring, evaluation, and treatment.  Findings and plan explained to the patient who consents to admission.     Impression & Plan      Medical Decision Making:  Ivan Gilman is a 33 year old otherwise healthy female who presents with intermittent abdominal pain for the last few days though notes an episode of severe abdominal pain in the early morning hours. History of fallopian tube cyst requiring surgery.  Due to severity of pain she presents to the emergency department for further evaluation.  Vitals are normal on arrival.  On exam, she has no localized tenderness with no rebound or guarding.  Discussed with the family and patient risk versus benefit of ordering a CT scan for further evaluation. Given that the patient has never experienced similar abdominal pain a CT scan was ordered for further evaluation.  This showed evidence of possible hemorrhagic cyst along with 3 cystic lesions in the  left ovary along with fluid suggestive of hemoperitoneum.  Pelvic ultrasound completed that showed 3 left ovarian cystic lesions one of these appearing complex and could be hemorrhagic along with echogenic free fluid in the pelvis.  No evidence of ovarian torsion.  HCG negative.  Initial hemoglobin 11.8 and repeat hemoglobin 10.5 after 1 liter of NS. I discussed the patient with Dr. Garcia of OB who recommended admission for serial hemoglobins.  The patient remained hemodynamically stable throughout her stay.  She was mildly tachycardic though this trended down.  There is no evidence for blood transfusion and she will be admitted for observation under the care of Dr. Garcia.  Patient and family agreed with the plan and all questions/concerns were addressed prior to admission.      Diagnosis:    ICD-10-CM    1. Ovarian cyst rupture N83.209        Disposition:  Admitted to the hospitalist    I, Bradley Aasen, am serving as a scribe on 9/1/2018 at 3:24 PM to personally document services performed by Indira Gooden PA-C based on my observations and the provider's statements to me.        Indira Gooden PA-C  09/01/18 2207

## 2018-09-01 NOTE — IP AVS SNAPSHOT
[facility]    [address] [place] [id number]    Phone:  [phone]                                       After Visit Summary   9/1/2018    Ivan Gilman    MRN: [id number]           After Visit Summary Signature Page     I have received my discharge instructions, and my questions have been answered. I have discussed any challenges I see with this plan with the nurse or doctor.    ..........................................................................................................................................  Patient/Patient Representative Signature      ..........................................................................................................................................  Patient Representative Print Name and Relationship to Patient    ..................................................               ................................................  Date                                            Time    ..........................................................................................................................................  Reviewed by Signature/Title    ...................................................              ..............................................  Date                                                            Time          22EPIC Rev 08/18

## 2018-09-02 VITALS
SYSTOLIC BLOOD PRESSURE: 108 MMHG | DIASTOLIC BLOOD PRESSURE: 54 MMHG | TEMPERATURE: 97 F | RESPIRATION RATE: 16 BRPM | WEIGHT: 167.2 LBS | HEIGHT: 72 IN | BODY MASS INDEX: 22.65 KG/M2 | OXYGEN SATURATION: 97 %

## 2018-09-02 PROBLEM — N83.209 RUPTURE OF OVARIAN CYST: Status: ACTIVE | Noted: 2018-09-02

## 2018-09-02 LAB
ERYTHROCYTE [DISTWIDTH] IN BLOOD BY AUTOMATED COUNT: 12.5 % (ref 10–15)
HCT VFR BLD AUTO: 29.1 % (ref 35–47)
HGB BLD-MCNC: 10.6 G/DL (ref 11.7–15.7)
MCH RBC QN AUTO: 32.9 PG (ref 26.5–33)
MCHC RBC AUTO-ENTMCNC: 36.4 G/DL (ref 31.5–36.5)
MCV RBC AUTO: 90 FL (ref 78–100)
PLATELET # BLD AUTO: 155 10E9/L (ref 150–450)
RBC # BLD AUTO: 3.22 10E12/L (ref 3.8–5.2)
WBC # BLD AUTO: 3.7 10E9/L (ref 4–11)

## 2018-09-02 PROCEDURE — 36415 COLL VENOUS BLD VENIPUNCTURE: CPT | Performed by: OBSTETRICS & GYNECOLOGY

## 2018-09-02 PROCEDURE — 85027 COMPLETE CBC AUTOMATED: CPT | Performed by: OBSTETRICS & GYNECOLOGY

## 2018-09-02 PROCEDURE — G0378 HOSPITAL OBSERVATION PER HR: HCPCS

## 2018-09-02 NOTE — PLAN OF CARE
Problem: Patient Care Overview  Goal: Plan of Care/Patient Progress Review  Outcome: Improving  VSS. Pain minimal. Neuros intact. Independent. Hgb 10.0. A + O x 4. Vitals q2h.

## 2018-09-02 NOTE — PLAN OF CARE
Problem: Patient Care Overview  Goal: Plan of Care/Patient Progress Review  Outcome: No Change  AVSS; pt with 2-3/10 abdominal discomfort,  denied nausea; pt declined pain meds/interventions; abdomen soft/flat; + flatus; pt up independently in the room; stated she is voiding without difficulty; IV NS at TKO; Hgb currently stable at 10.0 with next hgb due at about 0600; continue to monitor.

## 2018-09-02 NOTE — ED NOTES
"[facility]  ED Nurse Handoff Report    ED Chief complaint: Abdominal Pain (abdominal pain since Monday. No nausea or vomiting. )      ED Diagnosis:   Final diagnoses:   Ovarian cyst rupture       Code Status: Full Code    Allergies: No Known Allergies    Activity level - Baseline/Home:  Independent    Activity Level - Current:   Independent     Needed?: No    Isolation: No  Infection: Not Applicable  Bariatric?: No    Vital Signs:   Vitals:    09/01/18 1737 09/01/18 1738 09/01/18 1800 09/01/18 1830   BP: 129/74  121/67 122/61   Resp:       Temp:       TempSrc:       SpO2:  99% 99% 100%   Weight:       Height:           Cardiac Rhythm: ,        Pain level: 0-10 Pain Scale: 2    Is this patient confused?: No   [place] - Suicide Severity Rating Scale Completed?  Yes  If yes, what color did the patient score?  White    Patient Report: Initial Complaint: abdominal pain  Focused Assessment: Ivan Gilman is a 33 year old female who presents with abdominal pain. Patient reports abdominal pain and intermittent swelling for the last 5 days. She states that she often gets constipated for no particular reason. She did not have a bowel movement for 2 days, however she has had bowel movements that last 2 days. Patient states that 2 nights ago at bed time she had a sharp abdominal pain with swelling again. Last night she had the same abdominal pain, but she also had a \"electrical shock\" type feeling in her shoulders that shot down her back. She currently reports her abdominal pain at a 2-3/10. She is able to keep food down, however eating makes the pain worse. Movement also makes the pain worse. She denies any fevers or vomiting. Patient reports she should be getting her period in the next few days.  Tests Performed: labs, urine, abdominal cat scan, abdomen/pelvic ultrasound  Abnormal Results: decreased hemoglobin, ruptured ovarian cysts   Treatments provided: IV fluids, IV Toradol    Family Comments: "  and sister at bedside    OBS brochure/video discussed/provided to patient: yes    ED Medications:   Medications   0.9% sodium chloride BOLUS (0 mLs Intravenous Stopped 9/1/18 1808)     Followed by   sodium chloride 0.9% infusion (1,000 mLs Intravenous New Bag 9/1/18 1809)   ketorolac (TORADOL) injection 15 mg (15 mg Intravenous Given 9/1/18 1548)   iopamidol (ISOVUE-370) solution 83 mL (83 mLs Intravenous Given 9/1/18 1618)   Saline flush (65 mLs Intravenous Given 9/1/18 1619)       Drips infusing?:  No    For the majority of the shift this patient was Green.   Interventions performed were n/a.    Severe Sepsis OR Septic Shock Diagnosis Present: No      ED NURSE PHONE NUMBER: *[phone]

## 2018-09-02 NOTE — PHARMACY-ADMISSION MEDICATION HISTORY
Admission medication history interview status for the 9/1/2018  admission is complete. See EPIC admission navigator for prior to admission medications     Medication history source reliability:Good    Actions taken by pharmacist (provider contacted, etc):None     Additional medication history information not noted on PTA med list :None    Medication reconciliation/reorder completed by provider prior to medication history? No    Time spent in this activity: 5 minutes    Patient does not take any medications.    Prior to Admission medications    Medication Sig Last Dose Taking? Auth Provider   levonorgestrel (MIRENA) 20 MCG/24HR IUD 1 each by Intrauterine route once  Yes Reported, Patient

## 2018-09-02 NOTE — DISCHARGE INSTRUCTIONS
Follow up with Dr. Garcia's office in 4-6 weeks for ultrasound and exam.    [facility]  [address], [address]  [place], [place] [id number] [phone]

## 2018-09-02 NOTE — PLAN OF CARE
Problem: Patient Care Overview  Goal: Plan of Care/Patient Progress Review  Outcome: No Change  PRIMARY DIAGNOSIS: ACUTE PAIN  OUTPATIENT/OBSERVATION GOALS TO BE MET BEFORE DISCHARGE:  1. Pain Status: Partially met; pt declining pain meds at this time    2. Return to near baseline physical activity: Yes    3. Cleared for discharge by consultants (if involved): No    Discharge Planner Nurse   Safe discharge environment identified: Yes  Barriers to discharge: No       Entered by: Tesha Gates 09/02/2018 2:53 AM     Please review provider order for any additional goals.   Nurse to notify provider when observation goals have been met and patient is ready for discharge.

## 2018-09-02 NOTE — PLAN OF CARE
Problem: Patient Care Overview  Goal: Plan of Care/Patient Progress Review  Outcome: No Change  PRIMARY DIAGNOSIS: ACUTE PAIN  OUTPATIENT/OBSERVATION GOALS TO BE MET BEFORE DISCHARGE:  1. Pain Status: Partially met; pt declining pain meds at this time    2. Return to near baseline physical activity: Yes    3. Cleared for discharge by consultants (if involved): No    Discharge Planner Nurse   Safe discharge environment identified: Yes  Barriers to discharge: No       Entered by: Tesha Gates 09/02/2018 4:36 AM     Please review provider order for any additional goals.   Nurse to notify provider when observation goals have been met and patient is ready for discharge.

## 2018-09-02 NOTE — PROGRESS NOTES
GYN HD 1  vss afeb  No c/o pain improved  Abd- soft flat, NT  Hgb- 10  Imp- Ruptured ovarian cyst stable hgb  Plan- Discharge home f/u 4-6 wks with outpt ultrasound and exam Dr Garcia

## 2018-09-02 NOTE — PROGRESS NOTES
RECEIVING UNIT ED HANDOFF REVIEW    ED Nurse Handoff Report was reviewed by: Trevon Almanza on September 1, 2018 at 7:08 PM

## 2018-09-02 NOTE — PLAN OF CARE
Problem: Patient Care Overview  Goal: Plan of Care/Patient Progress Review  Outcome: No Change  PRIMARY DIAGNOSIS: ACUTE PAIN  OUTPATIENT/OBSERVATION GOALS TO BE MET BEFORE DISCHARGE:  1. Pain Status: Partially met; pt declining pain meds at this time    2. Return to near baseline physical activity: Yes    3. Cleared for discharge by consultants (if involved): No    Discharge Planner Nurse   Safe discharge environment identified: Yes  Barriers to discharge: No       Entered by: Liliya Fine 09/02/2018 8:36 AM     Please review provider order for any additional goals.   Nurse to notify provider when observation goals have been met and patient is ready for discharge.

## 2018-09-02 NOTE — PROVIDER NOTIFICATION
Dr. Garcia called to clarify IV fluids, MD ordered diet to be changed to a regular diet, and IV fluids to be changed to NS at TKO.

## 2018-09-12 NOTE — DISCHARGE SUMMARY
Admitted with Ruptured hemorrhagic cyst. Managed with observation and serial Hgb and exams. Discharged in stable condition to f/u in the outpatient setting

## 2018-12-03 ENCOUNTER — OFFICE VISIT (OUTPATIENT)
Dept: PODIATRY | Facility: CLINIC | Age: 33
End: 2018-12-03
Payer: COMMERCIAL

## 2018-12-03 VITALS
WEIGHT: 168 LBS | SYSTOLIC BLOOD PRESSURE: 138 MMHG | HEIGHT: 72 IN | DIASTOLIC BLOOD PRESSURE: 74 MMHG | BODY MASS INDEX: 22.75 KG/M2

## 2018-12-03 DIAGNOSIS — M76.62 ACHILLES TENDINITIS OF BOTH LOWER EXTREMITIES: Primary | ICD-10-CM

## 2018-12-03 DIAGNOSIS — M76.61 ACHILLES TENDINITIS OF BOTH LOWER EXTREMITIES: Primary | ICD-10-CM

## 2018-12-03 PROCEDURE — 99203 OFFICE O/P NEW LOW 30 MIN: CPT | Performed by: PODIATRIST

## 2018-12-03 NOTE — PROGRESS NOTES
ASSESSMENT/PLAN:    Encounter Diagnosis   Name Primary?     Achilles tendinitis of both lower extremities Yes     Her pain is intermittent and associated with running. No pain today.     We reviewed gentle calf/ Achilles stretching, ice, NSAIDS, the importance of replacing running shoes at least yearly.      Other options reviewed included bracing and arch support (orthoses or over the counter).    Because of her interest in running, I have referred her to [facility].         Body mass index is 22.16 kg/(m^2).      Dong Pavon, ALEKSANDR, FACFAS, MS    [place] [facility]le [facility]      ____________________________________________________________________    HPI:         Chief Complaint: heel pain, bilateral;  Right > left  Onset of problem: 1 year  Pain/ discomfort is described as:  Burning, deep ache, throbbing  Ratin/10   Frequency:  intermittent    The pain is made worse with running  Previous treatment: stopped running, ice, strength exercises, stretching - this helped. She has tried to return to running, but then pain recurs      *  Patient Active Problem List   Diagnosis     Rupture of ovarian cyst   *  *History reviewed. No pertinent surgical history.*  *  Current Outpatient Prescriptions   Medication Sig Dispense Refill     levonorgestrel (MIRENA) 20 MCG/24HR IUD 1 each by Intrauterine route once         ROS:     A 10-point review of systems was performed and is positive for that noted above in the HPI and as seen below.  All other areas are negative.     Numbness in feet?  no   Calf pain with walking? no  Recent foot/ankle injury? no  Weight change over past 12 months? no  Self perception as overweight? no  Recent flu-like symptoms? no  Joint pain other than feet ? no    Social History: Employment:  ye;  Exercise/Physical activity:  Cardio 2-5x/ week, strength training ;  Tobacco use:  no  Social History     Social History     Marital status:       "Spouse name: N/A     Number of children: N/A     Years of education: N/A     Occupational History     Not on file.     Social History Main Topics     Smoking status: Never Smoker     Smokeless tobacco: Never Used     Alcohol use Not on file     Drug use: Not on file     Sexual activity: Not on file     Other Topics Concern     Not on file     Social History Narrative       Family history:  History reviewed. No pertinent family history.    Rheumatoid arthritis:  no  Foot Problems: parent - with foot issues  Diabetes: no      EXAM:    Vitals: /74 (BP Location: Right arm, Patient Position: Chair, Cuff Size: Adult Regular)  Ht 6' 1\" (1.854 m)  Wt 168 lb (76.2 kg)  BMI 22.16 kg/m2  BMI: Body mass index is 22.16 kg/(m^2).  Height: 6' 1\"    Constitutional/ general:  Pt is in no apparent distress, appears well-nourished.  Cooperative with history and physical exam.     Vascular:  Pedal pulses are palpable bilaterally for both the DP and PT arteries.  CFT < 3 sec.  No edema.  Pedal hair growth noted.     Neuro:  Alert and oriented x 3. Coordinated gait.  Light touch sensation is intact to the L4, L5, S1 distributions. No obvious deficits.  No evidence of neurological-based weakness, spasticity, or contracture in the lower extremities.     Derm: Normal texture and turgor.  No erythema, ecchymosis, or cyanosis.  No open lesions.     Musculoskeletal:    Lower extremity muscle strength is normal.  Patient is ambulatory without an assistive device or brace .  No gross deformities.  Rectus feet with weight bearing.  Ankle and subtalar joint ROM within normal limits B/L.   Prominent bony bump, posterolateral right heel.    Dong Pavon DPM, FACFAS, MS    [place] [facility] of Podiatry/Foot & Ankle Surgery              "

## 2018-12-03 NOTE — LETTER
12/3/2018         RE: Ivan Gilman  [address]  [facility] [address]        Dear Colleague,    Thank you for referring your patient, Ivan Gilman, to the [facility]. Please see a copy of my visit note below.      ASSESSMENT/PLAN:    Encounter Diagnosis   Name Primary?     Achilles tendinitis of both lower extremities Yes     Her pain is intermittent and associated with running. No pain today.     We reviewed gentle calf/ Achilles stretching, ice, NSAIDS, the importance of replacing running shoes at least yearly.      Other options reviewed included bracing and arch support (orthoses or over the counter).    Because of her interest in running, I have referred her to [facility].         Body mass index is 22.16 kg/(m^2).      Dong Pavon DPM, FACFAS, MS    [place] [facility] & Ankle [facility]      ____________________________________________________________________    HPI:         Chief Complaint: heel pain, bilateral;  Right > left  Onset of problem: 1 year  Pain/ discomfort is described as:  Burning, deep ache, throbbing  Ratin/10   Frequency:  intermittent    The pain is made worse with running  Previous treatment: stopped running, ice, strength exercises, stretching - this helped. She has tried to return to running, but then pain recurs      *  Patient Active Problem List   Diagnosis     Rupture of ovarian cyst   *  *History reviewed. No pertinent surgical history.*  *  Current Outpatient Prescriptions   Medication Sig Dispense Refill     levonorgestrel (MIRENA) 20 MCG/24HR IUD 1 each by Intrauterine route once         ROS:     A 10-point review of systems was performed and is positive for that noted above in the HPI and as seen below.  All other areas are negative.     Numbness in feet?  no   Calf pain with walking? no  Recent foot/ankle injury? no  Weight change over past 12 months? no  Self perception as overweight? no  Recent  "flu-like symptoms? no  Joint pain other than feet ? no    Social History: Employment:  ye;  Exercise/Physical activity:  Cardio 2-5x/ week, strength training ;  Tobacco use:  no  Social History     Social History     Marital status:      Spouse name: N/A     Number of children: N/A     Years of education: N/A     Occupational History     Not on file.     Social History Main Topics     Smoking status: Never Smoker     Smokeless tobacco: Never Used     Alcohol use Not on file     Drug use: Not on file     Sexual activity: Not on file     Other Topics Concern     Not on file     Social History Narrative       Family history:  History reviewed. No pertinent family history.    Rheumatoid arthritis:  no  Foot Problems: parent - with foot issues  Diabetes: no      EXAM:    Vitals: /74 (BP Location: Right arm, Patient Position: Chair, Cuff Size: Adult Regular)  Ht 6' 1\" (1.854 m)  Wt 168 lb (76.2 kg)  BMI 22.16 kg/m2  BMI: Body mass index is 22.16 kg/(m^2).  Height: 6' 1\"    Constitutional/ general:  Pt is in no apparent distress, appears well-nourished.  Cooperative with history and physical exam.     Vascular:  Pedal pulses are palpable bilaterally for both the DP and PT arteries.  CFT < 3 sec.  No edema.  Pedal hair growth noted.     Neuro:  Alert and oriented x 3. Coordinated gait.  Light touch sensation is intact to the L4, L5, S1 distributions. No obvious deficits.  No evidence of neurological-based weakness, spasticity, or contracture in the lower extremities.     Derm: Normal texture and turgor.  No erythema, ecchymosis, or cyanosis.  No open lesions.     Musculoskeletal:    Lower extremity muscle strength is normal.  Patient is ambulatory without an assistive device or brace .  No gross deformities.  Rectus feet with weight bearing.  Ankle and subtalar joint ROM within normal limits B/L.   Prominent bony bump, posterolateral right heel.    Dong Pavon, ALEKSANDR, FACFAS, MS    [place] [facility] " Podiatry/Foot & Ankle Surgery                Again, thank you for allowing me to participate in the care of your patient.        Sincerely,        Dong Pavon DPM

## 2018-12-03 NOTE — PATIENT INSTRUCTIONS
Thank you for choosing [facility]!    DR. [facility]S CLIN[facility] LOCATIONS     MONDAY -  - [place]   [address] [place] [address] [address] [address]   [phone] / [phone] / [phone]       THURSDAY -  SCHEDULE SURGERY: [phone]   [address] APPOINTMENTS: [phone]   [place] [address] BILLING QUESTIONS: [phone] / [phone]       PHYSICAL THERAPY REFERRAL  [facility] ([facility])  [phone]      TENDONITIS   Tendons are the strong fibrous portions ofmuscles that attach to bones and allow the muscle to move a joint when it contracts. Tendons are very strong because they have a lot of force exerted on them. Sometimes tendons can become painful because they have suffered an acute injury, in which too much force was exerted at one time, or an overuse injury, in which a normal force was exerted too frequently or over a prolonged period of time. As a result, there is damage to the tendon and its surrounding soft tissue structures and they become inflammed. Because tendons do not have a great blood supply, they do not heal rapidly and the inflammation can become chronic.   Conservative treatment for tendinitis involves rest and anti-inflammatory measures. Ice is applied 15 minutes 2-3 times daily. Anti-inflammatory medications called NSAIDs (ibuprofen, example) can be taken provided they are used with caution, as they can lead to internal bleeding and increase the risk ofstroke and heart attack. Sometimes topical nitroglycerin is prescribed to help with pain. Often your doctor will use a special shoe or removable walking cast to immobilize the tendon, allowing it to heal without further damage from use. These devices are very useful in helping tendons heal, but they may slow you down or make you feel like your hip, knee, or back are out  ofalignment. This is temporary and should go away once you are out ofthe immobilization. You should not use a walking cast when showering or driving. Another option is Platelet Rich Plasma injections. (Normally done with a Sports and Orthorapedic doctor.   If conservative measures fail, your physician may need to surgically repair the tendon by removing any chronic inflammatory tissue and sewing it back together. Sometimes it is sewn to an adjacent tendon with similar function for support and sometimes it is lengthened. . Sometimes the bones around the tendon need to be realigned or reshaped to better support the tendon or prevent further damage. Your foot and ankle surgeon will discuss the specifics of your surgery with you, should you need it.    Towel stretch: Sit on a hard surface with your injured leg stretched out in front of you. Loop a towel around your toes and the ball of your foot and pull the towel toward your body keeping your leg straight. Hold this position for 15 to 30 seconds and then relax. Repeat 3 times. Then push the towel away with the ball of your foot. Repeat 3 times.  When you don't feel much of a stretch using the towel, you can start the standing calf stretch and the following exercises.  Standing calf stretch: Stand facing a wall with your hands on the wall at about eye level. Keep your injured leg back with your heel on the floor. Keep the other leg forward with the knee bent. Turn your back foot slightly inward (as if you were pigeon-toed). Slowly lean into the wall until you feel a stretch in the back of your calf. Hold the stretch for 15 to 30 seconds. Return to the starting position. Repeat 3 times. Do this exercise several times each day.   Standing soleus stretch: Stand facing a wall with your hands on the wall at about chest height. Keep your injured leg back with your heel on the floor. Keep the other leg forward with the knee bent. Turn your back foot slightly inward (as if you  were pigeon-toed). Bend your back knee slightly and gently lean into the wall until you feel a stretch in the lower calf of your injured leg. Hold the stretch for 15 to 30 seconds. Return to the starting position. Repeat 3 times.   Achilles stretch: Stand with the ball of one foot on a stair. Reach for the step below with your heel until you feel a stretch in the arch of your foot. Hold this position for 15 to 30 seconds and then relax. Repeat 3 times.   Heel raise: Balance yourself while standing behind a chair or counter. Using the chair or counter as a support to help you, raise your body up onto your toes and hold for 5 seconds. Then slowly lower yourself down without holding onto the support. (It's OK to keep holding onto the support if you need to.) When this exercise becomes less painful, try lowering yourself down on the injured leg only. Repeat 15 times. Do 2 sets of 15. Rest 30 seconds between sets.   Step-up: Stand with the foot of your injured leg on a support 3 to 5 inches high (like a small step or block of wood). Keep your other foot flat on the floor. Shift your weight onto the injured leg on the support. Straighten your injured leg as the other leg comes off the floor. Return to the starting position by bending your injured leg and slowly lowering your uninjured leg back to the floor. Do 2 sets of 15.   Resisted ankle eversion: Sit with both legs stretched out in front of you, with your feet about a shoulder's width apart. Tie a loop in one end of elastic tubing. Put the foot of your injured leg through the loop so that the tubing goes around the arch of that foot and wraps around the outside of the other foot. Hold onto the other end of the tubing with your hand to provide tension. Turn the foot of your injured leg up and out. Make sure you keep your other foot still so that it will allow the tubing to stretch as you move the foot of your injured leg. Return to the starting position. Do 2 sets of  15.   Balance and reach exercises: Stand next to a chair with your injured leg farther from the chair. The chair will provide support if you need it. Stand on the foot of your injured leg and bend your knee slightly. Try to raise the arch of this foot while keeping your big toe on the floor. Keep your foot in this position. With the hand that is farther away from the chair, reach forward in front of you by bending at the waist. Avoid bending your knee any more as you do this. Repeat this 10 times. To make the exercise more challenging, reach farther in front of you. Do 2 sets of 10.  the same position as above. While keeping your arch height, reach the hand that is farther away from the chair across your body toward the chair. The farther you reach, the more challenging the exercise. Do 2 sets of 10.     Resisted ankle eversion: Sit with both legs stretched out in front of you, with your feet about a shoulder's width apart. Tie a loop in one end of elastic tubing. Put the foot of your injured leg through the loop so that the tubing goes around the arch of that foot and wraps around the outside of the other foot. Hold onto the other end of the tubing with your hand to provide tension. Turn the foot of your injured leg up and out. Make sure you keep your other foot still so that it will allow the tubing to stretch as you move the foot of your injured leg. Return to the starting position. Do 2 sets of 15.   If you have access to a wobble board, do the following exercises:  Wobble board exercises:   Stand on a wobble board with your feet shoulder width apart. Rock the board forwards and backwards 30 times, then side to side 30 times. Hold on to a chair if you need support.   Rotate the wobble board around so that the edge of the board is in contact with the floor at all times. Do this 30 times in a clockwise and then a counterclockwise direction.   Balance on the wobble board for as long as you can without  letting the edges touch the floor. Try to do this for 2 minutes without touching the floor.   Rotate the wobble board in clockwise and counterclockwise circles, but do not let the edge of the board touch the floor.   When you have mastered exercises A through D, try repeating them while standing on just your injured leg.   After you are able to do these exercises on one leg, try to do them with your eyes closed. Make sure you have something nearby to support you in case you lose your balance.          FYI:  BODY MASS INDEX (BMI)  Many things can cause foot and ankle problems. Foot structure, activity level, foot mechanics and injuries are common causes of pain. One very important issue that often goes unmentioned, is body weight. Extra weight can cause increased stress on muscles, ligaments, bones and tendons. Sometimes just a few extra pounds is all it takes to put one over her/his threshold. Without reducing that stress, it can be difficult to alleviate pain. Some people are uncomfortable addressing this issue, but we feel it is important for you to think about it. As Foot & Ankle specialists, our job is addressing the lower extremity problem and possible causes. Regarding extra body weight, we encourage patients to discuss diet and weight management plans with their primary care doctors. It is this team approach that gives you the best opportunity for pain relief and getting you back on your feet.

## 2018-12-03 NOTE — MR AVS SNAPSHOT
After Visit Summary   12/3/2018    Ivan Gilman    MRN: [id number]           Patient Information     Date Of Birth          1985        Visit Information        Provider Department      12/3/2018 7:00 AM Dong Haney DPM [facility]        Today's Diagnoses     Achilles tendinitis of both lower extremities    -  1      Care Instructions    Thank you for choosing [facility]!    DR. HANEY'S CLINIC LOCATIONS     MONDAY - [facility]   [address] [address] [place] [address]   [phone] / [phone] / [phone]       THURSDAY -  SCHEDULE SURGERY: [phone]   [address] APPOINTMENTS: [phone]   [place] [address] BILLING QUESTIONS: [phone] / [phone]       PHYSICAL THERAPY REFERRAL  [facility] ([facility])  [phone]      TENDONITIS   Tendons are the strong fibrous portions ofmuscles that attach to bones and allow the muscle to move a joint when it contracts. Tendons are very strong because they have a lot of force exerted on them. Sometimes tendons can become painful because they have suffered an acute injury, in which too much force was exerted at one time, or an overuse injury, in which a normal force was exerted too frequently or over a prolonged period of time. As a result, there is damage to the tendon and its surrounding soft tissue structures and they become inflammed. Because tendons do not have a great blood supply, they do not heal rapidly and the inflammation can become chronic.   Conservative treatment for tendinitis involves rest and anti-inflammatory measures. Ice is applied 15 minutes 2-3 times daily. Anti-inflammatory medications called NSAIDs (ibuprofen, example) can be taken provided they are used with caution, as they can lead to internal bleeding and  increase the risk ofstroke and heart attack. Sometimes topical nitroglycerin is prescribed to help with pain. Often your doctor will use a special shoe or removable walking cast to immobilize the tendon, allowing it to heal without further damage from use. These devices are very useful in helping tendons heal, but they may slow you down or make you feel like your hip, knee, or back are out ofalignment. This is temporary and should go away once you are out ofthe immobilization. You should not use a walking cast when showering or driving. Another option is Platelet Rich Plasma injections. (Normally done with a Sports and Orthorapedic doctor.   If conservative measures fail, your physician may need to surgically repair the tendon by removing any chronic inflammatory tissue and sewing it back together. Sometimes it is sewn to an adjacent tendon with similar function for support and sometimes it is lengthened. . Sometimes the bones around the tendon need to be realigned or reshaped to better support the tendon or prevent further damage. Your foot and ankle surgeon will discuss the specifics of your surgery with you, should you need it.    Towel stretch: Sit on a hard surface with your injured leg stretched out in front of you. Loop a towel around your toes and the ball of your foot and pull the towel toward your body keeping your leg straight. Hold this position for 15 to 30 seconds and then relax. Repeat 3 times. Then push the towel away with the ball of your foot. Repeat 3 times.  When you don't feel much of a stretch using the towel, you can start the standing calf stretch and the following exercises.  Standing calf stretch: Stand facing a wall with your hands on the wall at about eye level. Keep your injured leg back with your heel on the floor. Keep the other leg forward with the knee bent. Turn your back foot slightly inward (as if you were pigeon-toed). Slowly lean into the wall until you feel a stretch in the  back of your calf. Hold the stretch for 15 to 30 seconds. Return to the starting position. Repeat 3 times. Do this exercise several times each day.   Standing soleus stretch: Stand facing a wall with your hands on the wall at about chest height. Keep your injured leg back with your heel on the floor. Keep the other leg forward with the knee bent. Turn your back foot slightly inward (as if you were pigeon-toed). Bend your back knee slightly and gently lean into the wall until you feel a stretch in the lower calf of your injured leg. Hold the stretch for 15 to 30 seconds. Return to the starting position. Repeat 3 times.   Achilles stretch: Stand with the ball of one foot on a stair. Reach for the step below with your heel until you feel a stretch in the arch of your foot. Hold this position for 15 to 30 seconds and then relax. Repeat 3 times.   Heel raise: Balance yourself while standing behind a chair or counter. Using the chair or counter as a support to help you, raise your body up onto your toes and hold for 5 seconds. Then slowly lower yourself down without holding onto the support. (It's OK to keep holding onto the support if you need to.) When this exercise becomes less painful, try lowering yourself down on the injured leg only. Repeat 15 times. Do 2 sets of 15. Rest 30 seconds between sets.   Step-up: Stand with the foot of your injured leg on a support 3 to 5 inches high (like a small step or block of wood). Keep your other foot flat on the floor. Shift your weight onto the injured leg on the support. Straighten your injured leg as the other leg comes off the floor. Return to the starting position by bending your injured leg and slowly lowering your uninjured leg back to the floor. Do 2 sets of 15.   Resisted ankle eversion: Sit with both legs stretched out in front of you, with your feet about a shoulder's width apart. Tie a loop in one end of elastic tubing. Put the foot of your injured leg through the  loop so that the tubing goes around the arch of that foot and wraps around the outside of the other foot. Hold onto the other end of the tubing with your hand to provide tension. Turn the foot of your injured leg up and out. Make sure you keep your other foot still so that it will allow the tubing to stretch as you move the foot of your injured leg. Return to the starting position. Do 2 sets of 15.   Balance and reach exercises: Stand next to a chair with your injured leg farther from the chair. The chair will provide support if you need it. Stand on the foot of your injured leg and bend your knee slightly. Try to raise the arch of this foot while keeping your big toe on the floor. Keep your foot in this position. With the hand that is farther away from the chair, reach forward in front of you by bending at the waist. Avoid bending your knee any more as you do this. Repeat this 10 times. To make the exercise more challenging, reach farther in front of you. Do 2 sets of 10.  the same position as above. While keeping your arch height, reach the hand that is farther away from the chair across your body toward the chair. The farther you reach, the more challenging the exercise. Do 2 sets of 10.     Resisted ankle eversion: Sit with both legs stretched out in front of you, with your feet about a shoulder's width apart. Tie a loop in one end of elastic tubing. Put the foot of your injured leg through the loop so that the tubing goes around the arch of that foot and wraps around the outside of the other foot. Hold onto the other end of the tubing with your hand to provide tension. Turn the foot of your injured leg up and out. Make sure you keep your other foot still so that it will allow the tubing to stretch as you move the foot of your injured leg. Return to the starting position. Do 2 sets of 15.   If you have access to a wobble board, do the following exercises:  Wobble board exercises:   Stand on a wobble  board with your feet shoulder width apart. Rock the board forwards and backwards 30 times, then side to side 30 times. Hold on to a chair if you need support.   Rotate the wobble board around so that the edge of the board is in contact with the floor at all times. Do this 30 times in a clockwise and then a counterclockwise direction.   Balance on the wobble board for as long as you can without letting the edges touch the floor. Try to do this for 2 minutes without touching the floor.   Rotate the wobble board in clockwise and counterclockwise circles, but do not let the edge of the board touch the floor.   When you have mastered exercises A through D, try repeating them while standing on just your injured leg.   After you are able to do these exercises on one leg, try to do them with your eyes closed. Make sure you have something nearby to support you in case you lose your balance.          FYI:  BODY MASS INDEX (BMI)  Many things can cause foot and ankle problems. Foot structure, activity level, foot mechanics and injuries are common causes of pain. One very important issue that often goes unmentioned, is body weight. Extra weight can cause increased stress on muscles, ligaments, bones and tendons. Sometimes just a few extra pounds is all it takes to put one over her/his threshold. Without reducing that stress, it can be difficult to alleviate pain. Some people are uncomfortable addressing this issue, but we feel it is important for you to think about it. As Foot & Ankle specialists, our job is addressing the lower extremity problem and possible causes. Regarding extra body weight, we encourage patients to discuss diet and weight management plans with their primary care doctors. It is this team approach that gives you the best opportunity for pain relief and getting you back on your feet.                Follow-ups after your visit        Who to contact     If you have questions or need follow up information about  "today's clinic visit or your schedule please contact [facility] directly at [phone].  Normal or non-critical lab and imaging results will be communicated to you by MyChart, letter or phone within 4 business days after the clinic has received the results. If you do not hear from us within 7 days, please contact the clinic through [employer] or phone. If you have a critical or abnormal lab result, we will notify you by phone as soon as possible.  Submit refill requests through [employer] or call your pharmacy and they will forward the refill request to us. Please allow 3 business days for your refill to be completed.          Additional Information About Your Visit        [employer] Information     [employer] gives you secure access to your electronic health record. If you see a primary care provider, you can also send messages to your care team and make appointments. If you have questions, please call your primary care clinic.  If you do not have a primary care provider, please call [phone] and they will assist you.        Care EveryWhere ID     This is your Care EveryWhere ID. This could be used by other organizations to access your [facility] medical records  [id number]        Your Vitals Were     Height BMI (Body Mass Index)                6' 1\" (1.854 m) 22.16 kg/m2           Blood Pressure from Last 3 Encounters:   12/03/18 138/74   09/02/18 108/54   01/21/18 147/90    Weight from Last 3 Encounters:   12/03/18 168 lb (76.2 kg)   09/01/18 167 lb 3.2 oz (75.8 kg)   01/21/18 164 lb (74.4 kg)              Today, you had the following     No orders found for display       Primary Care Provider Fax #    Physician No Ref-Primary [phone]       No address on file        Equal Access to Services     YOBANI MILLER : Karoline Landrum, wacathleen main, qaybta kaalmadavy griggs, noemy paulino. So [facility] [phone].    ATENCIÓN: Si habla español, tiene a briones " disposición servicios gratuitos de asistencia lingüística. Florentin jerome [phone].    We comply with applicable federal civil rights laws and [place] laws. We do not discriminate on the basis of race, color, national origin, age, disability, sex, sexual orientation, or gender identity.            Thank you!     Thank you for choosing [facility]  for your care. Our goal is always to provide you with excellent care. Hearing back from our patients is one way we can continue to improve our services. Please take a few minutes to complete the written survey that you may receive in the mail after your visit with us. Thank you!             Your Updated Medication List - Protect others around you: Learn how to safely use, store and throw away your medicines at www.disposemymeds.org.          This list is accurate as of 12/3/18  7:17 AM.  Always use your most recent med list.                   Brand Name Dispense Instructions for use Diagnosis    levonorgestrel 20 MCG/24HR IUD    MIRENA     1 each by Intrauterine route once

## 2018-12-10 ENCOUNTER — HOSPITAL PATHOLOGY (OUTPATIENT)
Dept: OTHER | Facility: CLINIC | Age: 33
End: 2018-12-10

## 2018-12-11 LAB — COPATH REPORT: NORMAL

## 2019-05-20 ENCOUNTER — OFFICE VISIT (OUTPATIENT)
Dept: FAMILY MEDICINE | Facility: CLINIC | Age: 34
End: 2019-05-20
Payer: COMMERCIAL

## 2019-05-20 VITALS
WEIGHT: 166.4 LBS | RESPIRATION RATE: 16 BRPM | HEART RATE: 113 BPM | BODY MASS INDEX: 22.54 KG/M2 | HEIGHT: 72 IN | DIASTOLIC BLOOD PRESSURE: 85 MMHG | TEMPERATURE: 98.2 F | SYSTOLIC BLOOD PRESSURE: 135 MMHG | OXYGEN SATURATION: 100 %

## 2019-05-20 DIAGNOSIS — Z00.00 HEALTH MAINTENANCE EXAMINATION: ICD-10-CM

## 2019-05-20 DIAGNOSIS — Z31.69 ENCOUNTER FOR PRECONCEPTION CONSULTATION: ICD-10-CM

## 2019-05-20 DIAGNOSIS — Z12.4 SCREENING FOR MALIGNANT NEOPLASM OF CERVIX: Primary | ICD-10-CM

## 2019-05-20 RX ORDER — CHOLECALCIFEROL (VITAMIN D3) 50 MCG
1 TABLET ORAL DAILY
Status: ON HOLD | COMMUNITY
End: 2020-05-05

## 2019-05-20 RX ORDER — PRENATAL VIT/IRON FUM/FOLIC AC 27MG-0.8MG
1 TABLET ORAL DAILY
COMMUNITY
End: 2023-03-28

## 2019-05-20 ASSESSMENT — MIFFLIN-ST. JEOR: SCORE: 1590.05

## 2019-05-20 NOTE — PROGRESS NOTES
Female Physical Note          HPI         Concerns today: No special concerns today.  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 No pertinent past medical history.    Previous Medical Care   [facility]     Family History   Problem Relation Age of Onset     Depression Mother      Parkinsonism Maternal Grandmother         in hospice     Hypertension Maternal Grandfather      Breast Cancer Paternal Grandmother 40         in early 40s            Review of Systems:     Review of Systems:  CONSTITUTIONAL: NEGATIVE for fever, chills, change in weight  INTEGUMENTARY/SKIN: NEGATIVE for worrisome rashes, moles or lesions  EYES: NEGATIVE for vision changes or irritation  ENT/MOUTH: NEGATIVE for ear, mouth and throat problems  RESP: NEGATIVE for significant cough or SOB  BREAST: NEGATIVE for masses, tenderness or discharge  CV: NEGATIVE for chest pain, palpitations or peripheral edema  GI: NEGATIVE for nausea, abdominal pain, heartburn, or change in bowel habits  : NEGATIVE for frequency, dysuria, or hematuria  MUSCULOSKELETAL: NEGATIVE for significant arthralgias or myalgia  NEURO: NEGATIVE for weakness, dizziness or paresthesias  ENDOCRINE: NEGATIVE for temperature intolerance, skin/hair changes  HEME/ALLERGY: NEGATIVE for bleeding problems  PSYCHIATRIC: NEGATIVE for changes in mood or affect  Sleep:   Do you snore most or the night (as reported by a family member)? No  Do you feel sleepy or extremely tired during most of the day? No    Stress management            Social History     Social History     Socioeconomic History     Marital  status:      Spouse name: Not on file     Number of children: Not on file     Years of education: Not on file     Highest education level: Not on file   Occupational History     Not on file   Social Needs     Financial resource strain: Not on file     Food insecurity:     Worry: Not on file     Inability: Not on file     Transportation needs:     Medical: Not on file     Non-medical: Not on file   Tobacco Use     Smoking status: Never Smoker     Smokeless tobacco: Never Used   Substance and Sexual Activity     Alcohol use: Yes     Comment: socially     Drug use: Never     Sexual activity: Yes     Partners: Male   Lifestyle     Physical activity:     Days per week: Not on file     Minutes per session: Not on file     Stress: Not on file   Relationships     Social connections:     Talks on phone: Not on file     Gets together: Not on file     Attends Sabianist service: Not on file     Active member of club or organization: Not on file     Attends meetings of clubs or organizations: Not on file     Relationship status: Not on file     Intimate partner violence:     Fear of current or ex partner: Not on file     Emotionally abused: Not on file     Physically abused: Not on file     Forced sexual activity: Not on file   Other Topics Concern     Not on file   Social History Narrative     Not on file       Marital Status:  Who lives in your household? Self, , dog    Has anyone hurt you physically, for example by pushing, hitting, slapping or kicking you or forcing you to have sex? Denies  Do you feel threatened or controlled by a partner, ex-partner or anyone in your life? Denies    Sexual Health     Sexual concerns: No   STI History: Neg  Pregnancy History: No obstetric history on file.  LMP Patient's last menstrual period was 05/05/2019.   Last Pap Smear Date: No results found for: PAP  Abnormal Pap History: None    Recommended Screening     Pap/HPV cotest every 5 years for women 30-65   Recommended  "and patient accepted testing.   HIV screening:  Recommended and patient declined testing.           Physical Exam:     Vitals: /85 (BP Location: Left arm, Patient Position: Sitting, Cuff Size: Adult Regular)   Pulse 113   Temp 98.2  F (36.8  C) (Oral)   Resp 16   Ht 1.85 m (6' 0.84\")   Wt 75.5 kg (166 lb 6.4 oz)   LMP 05/05/2019   SpO2 100%   BMI 22.05 kg/m    BMI= Body mass index is 22.05 kg/m .   GENERAL: healthy, alert and no distress  EYES: Eyes grossly normal to inspection, extraocular movements - intact, and PERRL  HENT: ear canals- normal; TMs- normal; Nose- normal; Mouth- no ulcers, no lesions  NECK: no tenderness, no adenopathy, no asymmetry, no masses, no stiffness; thyroid- normal to palpation  RESP: lungs clear to auscultation - no rales, no rhonchi, no wheezes  BREAST: no masses, no tenderness, no nipple discharge, no palpable axillary masses or adenopathy  CV: regular rates and rhythm, normal S1 S2, no S3 or S4 and no murmur, no click or rub -  ABDOMEN: soft, no tenderness, no  hepatosplenomegaly, no masses, normal bowel sounds  MS: extremities- no gross deformities noted, no edema  SKIN: no suspicious lesions, no rashes  NEURO: strength and tone- normal, sensory exam- grossly normal, mentation- intact, speech- normal, reflexes- symmetric  BACK: no CVA tenderness, no paralumbar tenderness  - female: cervix- normal, adnexae- normal; uterus- normal, no masses, no discharge  RECTAL- female: no masses, no hemorrhoids  PSYCH: Alert and oriented times 3; speech- coherent , normal rate and volume; able to articulate logical thoughts, able to abstract reason, no tangential thoughts, no hallucinations or delusions, affect- normal  LYMPHATICS: ant. cervical- normal, post. cervical- normal, axillary- normal, supraclavicular- normal, inguinal- normal      Assessment and Plan      Ivan was seen today for physical.    Diagnoses and all orders for this visit:    Screening for malignant neoplasm of " cervix  -     Pap imaged thin layer screen with HPV - recommended age 30 - 65 years (select HPV order below)  -     HPV High Risk Types DNA Cervical    Health maintenance examination    Encounter for preconception consultation        1. Updated pap/HPV today  2. UTD with immunizations  3. Discussed healthy diet, exercise in prep for future pregnancy  - tracking ovulation on bassem  - will follow up if questions arise    Options for treatment and follow-up care were reviewed with the patient . Ivan Gilman and/or guardian engaged in the decision making process and verbalized understanding of the options discussed and agreed with the final plan.    Delmis Snyder MD

## 2019-05-20 NOTE — PATIENT INSTRUCTIONS

## 2019-05-22 LAB
COPATH REPORT: NORMAL
PAP: NORMAL

## 2019-05-23 LAB
FINAL DIAGNOSIS: NORMAL
HPV HR 12 DNA CVX QL NAA+PROBE: NEGATIVE
HPV16 DNA SPEC QL NAA+PROBE: NEGATIVE
HPV18 DNA SPEC QL NAA+PROBE: NEGATIVE
SPECIMEN DESCRIPTION: NORMAL
SPECIMEN SOURCE CVX/VAG CYTO: NORMAL

## 2019-09-04 ENCOUNTER — THERAPY VISIT (OUTPATIENT)
Dept: PHYSICAL THERAPY | Facility: CLINIC | Age: 34
End: 2019-09-04
Attending: PODIATRIST
Payer: COMMERCIAL

## 2019-09-04 DIAGNOSIS — M76.62 ACHILLES TENDINITIS OF BOTH LOWER EXTREMITIES: ICD-10-CM

## 2019-09-04 DIAGNOSIS — M54.50 LUMBAGO: ICD-10-CM

## 2019-09-04 DIAGNOSIS — M76.61 ACHILLES TENDINITIS OF BOTH LOWER EXTREMITIES: ICD-10-CM

## 2019-09-04 PROCEDURE — 97161 PT EVAL LOW COMPLEX 20 MIN: CPT | Mod: GP | Performed by: PHYSICAL THERAPIST

## 2019-09-04 PROCEDURE — 97530 THERAPEUTIC ACTIVITIES: CPT | Mod: GP | Performed by: PHYSICAL THERAPIST

## 2019-09-04 PROCEDURE — 97110 THERAPEUTIC EXERCISES: CPT | Mod: GP | Performed by: PHYSICAL THERAPIST

## 2019-09-04 NOTE — PROGRESS NOTES
[facility] Initial Evaluation  Subjective:  The history is provided by the patient. No  was used.   Type of problem:  Bilateral feet (right>left; back pain)       Problem details: 12/2017 onset with running.  Stopped runnning and did eccentrics for several months then when she would try to start running again pain would return.  2 months ago tried to start runnning a little with some cross training, and it flared to the point of feeling pain with walking.  Any hill walking or jumping is significantly difficult.    Back pain present intermittantly since high school.  5 years ago was doing a lot of cross fit- coming up from a burpee she noted sharp pain in right lower back and had to stop.Experiences pain everyday with sneezing, lifting, and sitting etc.  Started chiro and stability exercises about 2 years ago.  Pain location right mid to low back.  Does not refer lower than right glut.    SMHx: played a lot of sports in high school (swimming, basketball)- this is when back pain initially  Started.  Xrays performed in high school-does not remember anything specific.  Used to swim but this significantly increases pain.    Running history: during onset in 2017- was ot training but was walking with dog who would pull a lot.  Has tried naty manipulation and glut ex and this has improved but did not impact pain.  Has run 4 marathons in the past without issues.  Does not report sig change in activity with the onset.      Current activity: yoga, daily walk, arm weights, squats, HIIT.  Sits a lot for work.    Goal: run 4x/week x 5 miles; possibly 1/2 marathon or trail running..   Patient reports pain:  Sub calcaneus and posterior (right/low back). Radiates to:  No radiation. Associated symptoms:  Other. Symptoms are exacerbated by running, walking and bending/squatting                       Objective:  Standing Alignment:        Lumbar deviations alignment: SGIS left  ++right.      Knee:  Genu recurvatum R    General Deviations:  Toe in L (inc arch height left, dec calc ever right w late pronation at toe off.)            Ankle/Foot Evaluation  ROM:  AROM is normal.PROM is normal.      Strength is normal.  LIGAMENT TESTING: normal                PALPATION: Palpation of ankle: mildly tender mid substnace, no swelling/hypovascular areas.    Right ankle tenderness present at:   achilles tendon      FUNCTIONAL TESTS:         Quad:  Single Leg Squat Left: Control is moderate loss of control.  Single Leg Squat Right: Control is moderate loss of control  Bilateral Leg Squat:  Control is mild loss of control           Lumbar/SI Evaluation  ROM:    AROM Lumbar:   Flexion:         To mid lower leg with dec reversal of lordosis  Ext:                    Full, no pain   Side Bend:        Left:     Right:   Rotation:           Left:     Right:   Side Glide:        Left:  ++right    Right:                   Neural Tension/Mobility:  Neural tension wnl lumbar: +heel pain w SLR/DF.                                                               Robert Lumbar Evaluation    Posture:      Lordosis: Accentuated  Lateral Shift: yes and left        Test Movements:      PEDRO: During: increases  After: worse      EIL: During: increases                                                     ROS     PEDRO: inc back, inc heel  EIL: inc back, NE heel  SGIS: inc central LB, dec  heel    ASSESSMENT/PLAN:    Patient is a 34 year old female with LB, achilles complaints.    Patient has the following significant findings with corresponding treatment plan.                Diagnosis 1 and 2:  LBP and achilles pain  Pain -  manual therapy, self management, education, directional preference exercise and home program  Decreased ROM/flexibility - manual therapy and therapeutic exercise  Decreased joint mobility - manual therapy and therapeutic exercise  Decreased strength - therapeutic exercise and therapeutic  activities  Decreased proprioception - neuro re-education and therapeutic activities  Impaired gait - gait training  Impaired muscle performance - neuro re-education  Decreased function - therapeutic activities  Impaired posture - neuro re-education    Previous and current functional limitations:  (See Goal Flow Sheet for this information)    Short term and Long term goals: (See Goal Flow Sheet for this information)     Communication ability:  Patient appears to be able to clearly communicate and understand verbal and written communication and follow directions correctly.  Treatment Explanation - The following has been discussed with the patient:   RX ordered/plan of care  Anticipated outcomes  Possible risks and side effects  This patient would benefit from PT intervention to resume normal activities.   Rehab potential is good.    Frequency:  1X week, once daily  Duration:  for 12 weeks  Discharge Plan:  Achieve all LTG.  Independent in home treatment program.  Reach maximal therapeutic benefit.    Please refer to the daily flowsheet for treatment today, total treatment time and time spent performing 1:1 timed codes.

## 2019-09-05 NOTE — PROGRESS NOTES
[facility] Initial Evaluation  Subjective:    Ivan Gilman being seen for achelles tendon & back.   Date of Onset: Nov. 2017. HPI: Documentation: running.  and reported as 2/10 on pain scale. General health as reported by patient is excellent. Pertinent medical history includes:  Migraines/headaches.         Primary job tasks include:  Computer work.           Occupation: health .                           Objective:  System    Physical Exam    General     ROS    Assessment/Plan:

## 2019-09-12 ENCOUNTER — THERAPY VISIT (OUTPATIENT)
Dept: PHYSICAL THERAPY | Facility: CLINIC | Age: 34
End: 2019-09-12
Payer: COMMERCIAL

## 2019-09-12 DIAGNOSIS — M54.50 LUMBAGO: ICD-10-CM

## 2019-09-12 PROCEDURE — 97530 THERAPEUTIC ACTIVITIES: CPT | Mod: GP | Performed by: PHYSICAL THERAPIST

## 2019-09-12 PROCEDURE — 97110 THERAPEUTIC EXERCISES: CPT | Mod: GP | Performed by: PHYSICAL THERAPIST

## 2019-09-23 ENCOUNTER — THERAPY VISIT (OUTPATIENT)
Dept: PHYSICAL THERAPY | Facility: CLINIC | Age: 34
End: 2019-09-23
Payer: COMMERCIAL

## 2019-09-23 DIAGNOSIS — M54.50 LUMBAGO: ICD-10-CM

## 2019-09-23 PROCEDURE — 97530 THERAPEUTIC ACTIVITIES: CPT | Mod: GP | Performed by: PHYSICAL THERAPIST

## 2019-09-23 PROCEDURE — 97110 THERAPEUTIC EXERCISES: CPT | Mod: GP | Performed by: PHYSICAL THERAPIST

## 2019-10-22 DIAGNOSIS — O21.1 HYPEREMESIS GRAVIDARUM WITH DEHYDRATION: ICD-10-CM

## 2019-10-22 RX ORDER — DEXTROSE, SODIUM CHLORIDE, SODIUM LACTATE, POTASSIUM CHLORIDE, AND CALCIUM CHLORIDE 5; .6; .31; .03; .02 G/100ML; G/100ML; G/100ML; G/100ML; G/100ML
1000 INJECTION, SOLUTION INTRAVENOUS ONCE
Status: CANCELLED | OUTPATIENT
Start: 2019-10-22

## 2019-10-23 LAB
ABO + RH BLD: NORMAL
ABO + RH BLD: NORMAL
BLD GP AB SCN SERPL QL: NORMAL
HBV SURFACE AG SERPL QL IA: NEGATIVE
HIV 1+2 AB+HIV1 P24 AG SERPL QL IA: NON REACTIVE
RUBELLA ABY IGG: NORMAL
TREPONEMA ANTIBODIES: NON REACTIVE

## 2019-11-08 NOTE — PROGRESS NOTES
Discharge Note    Progress reporting period is from last progress note on Sept 4  to Sep 23, 2019.    Ivan failed to follow up and current status is unknown.  Please see information below for last relevant information on current status.  Patient seen for 3 visits.    SUBJECTIVE  Subjective changes noted by patient:  Running 3 in/walk 2 min and going well;  exercise changes heel pain.  .  Current pain level is  .     Previous pain level was   .   Changes in function:  Yes (See Goal flowsheet attached for changes in current functional level)  Adverse reaction to treatment or activity: None    OBJECTIVE  Changes noted in objective findings: See running assessment     ASSESSMENT/PLAN  Diagnosis: Achilles Tendon and LBP   Updated problem list and treatment plan:     Pain - HEP  Decreased ROM/flexibility - HEP  Decreased function - HEP  Decreased strength - HEP  Impaired muscle performance - HEP  Impaired gait - HEP  Decreased proprioception - HEP  Impaired posture - HEP  Decreased joint mobility - HEP  STG/LTGs have been met or progress has been made towards goals:  Yes, please see goal flowsheet for most current information  Assessment of Progress: current status is unknown.    Last current status: Pt is progressing as expected   Self Management Plans:  HEP  I have re-evaluated this patient and find that the nature, scope, duration and intensity of the therapy is appropriate for the medical condition of the patient.  Ivan continues to require the following intervention to meet STG and LTG's:  HEP.    Recommendations:  Discharge with current home program.  Patient to follow up with MD as needed.    Please refer to the daily flowsheet for treatment today, total treatment time and time spent performing 1:1 timed codes.

## 2019-11-19 PROBLEM — M54.50 LUMBAGO: Status: RESOLVED | Noted: 2019-09-04 | Resolved: 2019-11-19

## 2020-04-16 LAB — GROUP B STREP PCR: NEGATIVE

## 2020-04-29 DIAGNOSIS — Z00.00 LABORATORY EXAMINATION ORDERED AS PART OF A ROUTINE GENERAL MEDICAL EXAMINATION: Primary | ICD-10-CM

## 2020-05-04 ENCOUNTER — HOSPITAL ENCOUNTER (OUTPATIENT)
Dept: LAB | Facility: CLINIC | Age: 35
Discharge: HOME | End: 2020-05-04
Attending: PHYSICIAN ASSISTANT | Admitting: PHYSICIAN ASSISTANT
Payer: COMMERCIAL

## 2020-05-04 ENCOUNTER — OFFICE VISIT (OUTPATIENT)
Dept: URGENT CARE | Facility: URGENT CARE | Age: 35
End: 2020-05-04
Attending: PHYSICIAN ASSISTANT
Payer: COMMERCIAL

## 2020-05-04 DIAGNOSIS — Z00.00 LABORATORY EXAMINATION ORDERED AS PART OF A ROUTINE GENERAL MEDICAL EXAMINATION: ICD-10-CM

## 2020-05-04 LAB
ABO + RH BLD: NORMAL
ABO + RH BLD: NORMAL
BLD GP AB SCN SERPL QL: NORMAL
BLOOD BANK CMNT PATIENT-IMP: NORMAL
ERYTHROCYTE [DISTWIDTH] IN BLOOD BY AUTOMATED COUNT: 13.1 % (ref 10–15)
HCT VFR BLD AUTO: 36.1 % (ref 35–47)
HGB BLD-MCNC: 12.9 G/DL (ref 11.7–15.7)
MCH RBC QN AUTO: 34.8 PG (ref 26.5–33)
MCHC RBC AUTO-ENTMCNC: 35.7 G/DL (ref 31.5–36.5)
MCV RBC AUTO: 97 FL (ref 78–100)
PLATELET # BLD AUTO: 185 10E9/L (ref 150–450)
RBC # BLD AUTO: 3.71 10E12/L (ref 3.8–5.2)
SARS-COV-2 PCR COMMENT: NORMAL
SARS-COV-2 RNA SPEC QL NAA+PROBE: NEGATIVE
SARS-COV-2 RNA SPEC QL NAA+PROBE: NORMAL
SPECIMEN EXP DATE BLD: NORMAL
SPECIMEN SOURCE: NORMAL
SPECIMEN SOURCE: NORMAL
WBC # BLD AUTO: 7.7 10E9/L (ref 4–11)

## 2020-05-04 PROCEDURE — 86780 TREPONEMA PALLIDUM: CPT | Performed by: PHYSICIAN ASSISTANT

## 2020-05-04 PROCEDURE — 99207 ZZC NO BILLABLE SERVICE THIS VISIT: CPT

## 2020-05-04 PROCEDURE — 85027 COMPLETE CBC AUTOMATED: CPT | Performed by: PHYSICIAN ASSISTANT

## 2020-05-04 PROCEDURE — 87635 SARS-COV-2 COVID-19 AMP PRB: CPT | Performed by: PHYSICIAN ASSISTANT

## 2020-05-04 PROCEDURE — 86900 BLOOD TYPING SEROLOGIC ABO: CPT | Performed by: PHYSICIAN ASSISTANT

## 2020-05-04 PROCEDURE — 86901 BLOOD TYPING SEROLOGIC RH(D): CPT | Performed by: PHYSICIAN ASSISTANT

## 2020-05-04 PROCEDURE — 86850 RBC ANTIBODY SCREEN: CPT | Performed by: PHYSICIAN ASSISTANT

## 2020-05-04 PROCEDURE — 36415 COLL VENOUS BLD VENIPUNCTURE: CPT | Performed by: PHYSICIAN ASSISTANT

## 2020-05-05 ENCOUNTER — ANESTHESIA EVENT (OUTPATIENT)
Dept: OBGYN | Facility: CLINIC | Age: 35
End: 2020-05-05
Payer: COMMERCIAL

## 2020-05-05 ENCOUNTER — ANESTHESIA (OUTPATIENT)
Dept: OBGYN | Facility: CLINIC | Age: 35
End: 2020-05-05
Payer: COMMERCIAL

## 2020-05-05 ENCOUNTER — HOSPITAL ENCOUNTER (INPATIENT)
Facility: CLINIC | Age: 35
LOS: 2 days | Discharge: HOME | End: 2020-05-07
Attending: OBSTETRICS & GYNECOLOGY | Admitting: OBSTETRICS & GYNECOLOGY
Payer: COMMERCIAL

## 2020-05-05 LAB — T PALLIDUM AB SER QL: NONREACTIVE

## 2020-05-05 PROCEDURE — 71000013 ZZH RECOVERY PHASE 1 LEVEL 1 EA ADDTL HR: Performed by: OBSTETRICS & GYNECOLOGY

## 2020-05-05 PROCEDURE — 37000008 ZZH ANESTHESIA TECHNICAL FEE, 1ST 30 MIN: Performed by: OBSTETRICS & GYNECOLOGY

## 2020-05-05 PROCEDURE — 25800030 ZZH RX IP 258 OP 636: Performed by: NURSE ANESTHETIST, CERTIFIED REGISTERED

## 2020-05-05 PROCEDURE — 25000128 H RX IP 250 OP 636: Performed by: OBSTETRICS & GYNECOLOGY

## 2020-05-05 PROCEDURE — 27210794 ZZH OR GENERAL SUPPLY STERILE: Performed by: OBSTETRICS & GYNECOLOGY

## 2020-05-05 PROCEDURE — 36000056 ZZH SURGERY LEVEL 3 1ST 30 MIN: Performed by: OBSTETRICS & GYNECOLOGY

## 2020-05-05 PROCEDURE — 25000125 ZZHC RX 250: Performed by: NURSE ANESTHETIST, CERTIFIED REGISTERED

## 2020-05-05 PROCEDURE — 71000012 ZZH RECOVERY PHASE 1 LEVEL 1 FIRST HR: Performed by: OBSTETRICS & GYNECOLOGY

## 2020-05-05 PROCEDURE — 36000058 ZZH SURGERY LEVEL 3 EA 15 ADDTL MIN: Performed by: OBSTETRICS & GYNECOLOGY

## 2020-05-05 PROCEDURE — 12000035 ZZH R&B POSTPARTUM

## 2020-05-05 PROCEDURE — 25000132 ZZH RX MED GY IP 250 OP 250 PS 637: Performed by: OBSTETRICS & GYNECOLOGY

## 2020-05-05 PROCEDURE — 25000125 ZZHC RX 250: Performed by: OBSTETRICS & GYNECOLOGY

## 2020-05-05 PROCEDURE — 25000128 H RX IP 250 OP 636: Performed by: NURSE ANESTHETIST, CERTIFIED REGISTERED

## 2020-05-05 PROCEDURE — 25800030 ZZH RX IP 258 OP 636: Performed by: OBSTETRICS & GYNECOLOGY

## 2020-05-05 PROCEDURE — 25000132 ZZH RX MED GY IP 250 OP 250 PS 637

## 2020-05-05 PROCEDURE — 37000009 ZZH ANESTHESIA TECHNICAL FEE, EACH ADDTL 15 MIN: Performed by: OBSTETRICS & GYNECOLOGY

## 2020-05-05 PROCEDURE — 88307 TISSUE EXAM BY PATHOLOGIST: CPT | Mod: 26,76 | Performed by: OBSTETRICS & GYNECOLOGY

## 2020-05-05 PROCEDURE — 88307 TISSUE EXAM BY PATHOLOGIST: CPT | Performed by: OBSTETRICS & GYNECOLOGY

## 2020-05-05 RX ORDER — TRANEXAMIC ACID 10 MG/ML
1 INJECTION, SOLUTION INTRAVENOUS EVERY 30 MIN PRN
Status: DISCONTINUED | OUTPATIENT
Start: 2020-05-05 | End: 2020-05-05

## 2020-05-05 RX ORDER — MORPHINE SULFATE 0.5 MG/ML
INJECTION, SOLUTION EPIDURAL; INTRATHECAL; INTRAVENOUS PRN
Status: DISCONTINUED | OUTPATIENT
Start: 2020-05-05 | End: 2020-05-05

## 2020-05-05 RX ORDER — ONDANSETRON 2 MG/ML
4 INJECTION INTRAMUSCULAR; INTRAVENOUS EVERY 6 HOURS PRN
Status: DISCONTINUED | OUTPATIENT
Start: 2020-05-05 | End: 2020-05-07 | Stop reason: CLARIF

## 2020-05-05 RX ORDER — FENTANYL CITRATE 50 UG/ML
INJECTION, SOLUTION INTRAMUSCULAR; INTRAVENOUS PRN
Status: DISCONTINUED | OUTPATIENT
Start: 2020-05-05 | End: 2020-05-05

## 2020-05-05 RX ORDER — SIMETHICONE 80 MG
80 TABLET,CHEWABLE ORAL 4 TIMES DAILY PRN
Status: DISCONTINUED | OUTPATIENT
Start: 2020-05-05 | End: 2020-05-05

## 2020-05-05 RX ORDER — OXYTOCIN/0.9 % SODIUM CHLORIDE 30/500 ML
340 PLASTIC BAG, INJECTION (ML) INTRAVENOUS CONTINUOUS PRN
Status: DISCONTINUED | OUTPATIENT
Start: 2020-05-05 | End: 2020-05-05

## 2020-05-05 RX ORDER — DEXTROSE, SODIUM CHLORIDE, SODIUM LACTATE, POTASSIUM CHLORIDE, AND CALCIUM CHLORIDE 5; .6; .31; .03; .02 G/100ML; G/100ML; G/100ML; G/100ML; G/100ML
INJECTION, SOLUTION INTRAVENOUS CONTINUOUS
Status: DISCONTINUED | OUTPATIENT
Start: 2020-05-05 | End: 2020-05-07 | Stop reason: HOSPADM

## 2020-05-05 RX ORDER — LIDOCAINE 40 MG/G
CREAM TOPICAL
Status: DISCONTINUED | OUTPATIENT
Start: 2020-05-05 | End: 2020-05-07 | Stop reason: HOSPADM

## 2020-05-05 RX ORDER — ACETAMINOPHEN 325 MG/1
975 TABLET ORAL EVERY 6 HOURS
Status: DISCONTINUED | OUTPATIENT
Start: 2020-05-05 | End: 2020-05-05

## 2020-05-05 RX ORDER — MODIFIED LANOLIN
OINTMENT (GRAM) TOPICAL
Status: DISCONTINUED | OUTPATIENT
Start: 2020-05-05 | End: 2020-05-07 | Stop reason: HOSPADM

## 2020-05-05 RX ORDER — OXYTOCIN/0.9 % SODIUM CHLORIDE 30/500 ML
340 PLASTIC BAG, INJECTION (ML) INTRAVENOUS CONTINUOUS PRN
Status: DISCONTINUED | OUTPATIENT
Start: 2020-05-05 | End: 2020-05-07 | Stop reason: HOSPADM

## 2020-05-05 RX ORDER — KETOROLAC TROMETHAMINE 30 MG/ML
30 INJECTION, SOLUTION INTRAMUSCULAR; INTRAVENOUS EVERY 6 HOURS
Status: COMPLETED | OUTPATIENT
Start: 2020-05-05 | End: 2020-05-06

## 2020-05-05 RX ORDER — ONDANSETRON 2 MG/ML
INJECTION INTRAMUSCULAR; INTRAVENOUS PRN
Status: DISCONTINUED | OUTPATIENT
Start: 2020-05-05 | End: 2020-05-05

## 2020-05-05 RX ORDER — ONDANSETRON 2 MG/ML
4 INJECTION INTRAMUSCULAR; INTRAVENOUS EVERY 6 HOURS PRN
Status: DISCONTINUED | OUTPATIENT
Start: 2020-05-05 | End: 2020-05-05

## 2020-05-05 RX ORDER — AMOXICILLIN 250 MG
1 CAPSULE ORAL 2 TIMES DAILY
Status: DISCONTINUED | OUTPATIENT
Start: 2020-05-05 | End: 2020-05-07 | Stop reason: HOSPADM

## 2020-05-05 RX ORDER — SODIUM CHLORIDE, SODIUM LACTATE, POTASSIUM CHLORIDE, CALCIUM CHLORIDE 600; 310; 30; 20 MG/100ML; MG/100ML; MG/100ML; MG/100ML
INJECTION, SOLUTION INTRAVENOUS CONTINUOUS
Status: DISCONTINUED | OUTPATIENT
Start: 2020-05-05 | End: 2020-05-05 | Stop reason: HOSPADM

## 2020-05-05 RX ORDER — CEFAZOLIN SODIUM 1 G/3ML
1 INJECTION, POWDER, FOR SOLUTION INTRAMUSCULAR; INTRAVENOUS SEE ADMIN INSTRUCTIONS
Status: DISCONTINUED | OUTPATIENT
Start: 2020-05-05 | End: 2020-05-05 | Stop reason: HOSPADM

## 2020-05-05 RX ORDER — SIMETHICONE 80 MG
80 TABLET,CHEWABLE ORAL 4 TIMES DAILY PRN
Status: DISCONTINUED | OUTPATIENT
Start: 2020-05-05 | End: 2020-05-07 | Stop reason: HOSPADM

## 2020-05-05 RX ORDER — HYDROCORTISONE 2.5 %
CREAM (GRAM) TOPICAL 3 TIMES DAILY PRN
Status: DISCONTINUED | OUTPATIENT
Start: 2020-05-05 | End: 2020-05-05

## 2020-05-05 RX ORDER — TRANEXAMIC ACID 10 MG/ML
1 INJECTION, SOLUTION INTRAVENOUS EVERY 30 MIN PRN
Status: DISCONTINUED | OUTPATIENT
Start: 2020-05-05 | End: 2020-05-07 | Stop reason: HOSPADM

## 2020-05-05 RX ORDER — LIDOCAINE 40 MG/G
CREAM TOPICAL
Status: DISCONTINUED | OUTPATIENT
Start: 2020-05-05 | End: 2020-05-07 | Stop reason: CLARIF

## 2020-05-05 RX ORDER — OXYTOCIN 10 [USP'U]/ML
10 INJECTION, SOLUTION INTRAMUSCULAR; INTRAVENOUS
Status: DISCONTINUED | OUTPATIENT
Start: 2020-05-05 | End: 2020-05-05

## 2020-05-05 RX ORDER — BISACODYL 10 MG
10 SUPPOSITORY, RECTAL RECTAL DAILY PRN
Status: DISCONTINUED | OUTPATIENT
Start: 2020-05-07 | End: 2020-05-07 | Stop reason: HOSPADM

## 2020-05-05 RX ORDER — OXYTOCIN/0.9 % SODIUM CHLORIDE 30/500 ML
100 PLASTIC BAG, INJECTION (ML) INTRAVENOUS CONTINUOUS
Status: DISCONTINUED | OUTPATIENT
Start: 2020-05-05 | End: 2020-05-05

## 2020-05-05 RX ORDER — DEXTROSE, SODIUM CHLORIDE, SODIUM LACTATE, POTASSIUM CHLORIDE, AND CALCIUM CHLORIDE 5; .6; .31; .03; .02 G/100ML; G/100ML; G/100ML; G/100ML; G/100ML
INJECTION, SOLUTION INTRAVENOUS CONTINUOUS
Status: DISCONTINUED | OUTPATIENT
Start: 2020-05-05 | End: 2020-05-05

## 2020-05-05 RX ORDER — OXYTOCIN/0.9 % SODIUM CHLORIDE 30/500 ML
PLASTIC BAG, INJECTION (ML) INTRAVENOUS CONTINUOUS PRN
Status: DISCONTINUED | OUTPATIENT
Start: 2020-05-05 | End: 2020-05-05

## 2020-05-05 RX ORDER — NALOXONE HYDROCHLORIDE 0.4 MG/ML
.1-.4 INJECTION, SOLUTION INTRAMUSCULAR; INTRAVENOUS; SUBCUTANEOUS
Status: DISCONTINUED | OUTPATIENT
Start: 2020-05-05 | End: 2020-05-05

## 2020-05-05 RX ORDER — ONDANSETRON 4 MG/1
4 TABLET, ORALLY DISINTEGRATING ORAL EVERY 6 HOURS PRN
Status: DISCONTINUED | OUTPATIENT
Start: 2020-05-05 | End: 2020-05-07 | Stop reason: CLARIF

## 2020-05-05 RX ORDER — IBUPROFEN 400 MG/1
800 TABLET, FILM COATED ORAL EVERY 6 HOURS
Status: DISCONTINUED | OUTPATIENT
Start: 2020-05-06 | End: 2020-05-07 | Stop reason: HOSPADM

## 2020-05-05 RX ORDER — IBUPROFEN 400 MG/1
800 TABLET, FILM COATED ORAL EVERY 6 HOURS PRN
Status: DISCONTINUED | OUTPATIENT
Start: 2020-05-05 | End: 2020-05-05

## 2020-05-05 RX ORDER — CITRIC ACID/SODIUM CITRATE 334-500MG
30 SOLUTION, ORAL ORAL
Status: COMPLETED | OUTPATIENT
Start: 2020-05-05 | End: 2020-05-05

## 2020-05-05 RX ORDER — HYDROCORTISONE 2.5 %
CREAM (GRAM) TOPICAL 3 TIMES DAILY PRN
Status: DISCONTINUED | OUTPATIENT
Start: 2020-05-05 | End: 2020-05-07 | Stop reason: HOSPADM

## 2020-05-05 RX ORDER — MODIFIED LANOLIN
OINTMENT (GRAM) TOPICAL
Status: DISCONTINUED | OUTPATIENT
Start: 2020-05-05 | End: 2020-05-05

## 2020-05-05 RX ORDER — AMOXICILLIN 250 MG
2 CAPSULE ORAL 2 TIMES DAILY
Status: DISCONTINUED | OUTPATIENT
Start: 2020-05-05 | End: 2020-05-05

## 2020-05-05 RX ORDER — LIDOCAINE 40 MG/G
CREAM TOPICAL
Status: DISCONTINUED | OUTPATIENT
Start: 2020-05-05 | End: 2020-05-05

## 2020-05-05 RX ORDER — NALOXONE HYDROCHLORIDE 0.4 MG/ML
.1-.4 INJECTION, SOLUTION INTRAMUSCULAR; INTRAVENOUS; SUBCUTANEOUS
Status: DISCONTINUED | OUTPATIENT
Start: 2020-05-05 | End: 2020-05-07 | Stop reason: HOSPADM

## 2020-05-05 RX ORDER — AMOXICILLIN 250 MG
2 CAPSULE ORAL 2 TIMES DAILY
Status: DISCONTINUED | OUTPATIENT
Start: 2020-05-05 | End: 2020-05-07 | Stop reason: HOSPADM

## 2020-05-05 RX ORDER — NALBUPHINE HYDROCHLORIDE 10 MG/ML
2.5-5 INJECTION, SOLUTION INTRAMUSCULAR; INTRAVENOUS; SUBCUTANEOUS EVERY 6 HOURS PRN
Status: DISCONTINUED | OUTPATIENT
Start: 2020-05-05 | End: 2020-05-07 | Stop reason: CLARIF

## 2020-05-05 RX ORDER — CEFAZOLIN SODIUM 2 G/100ML
2 INJECTION, SOLUTION INTRAVENOUS
Status: COMPLETED | OUTPATIENT
Start: 2020-05-05 | End: 2020-05-05

## 2020-05-05 RX ORDER — HYDROMORPHONE HYDROCHLORIDE 1 MG/ML
.3-.5 INJECTION, SOLUTION INTRAMUSCULAR; INTRAVENOUS; SUBCUTANEOUS EVERY 30 MIN PRN
Status: DISCONTINUED | OUTPATIENT
Start: 2020-05-05 | End: 2020-05-07 | Stop reason: HOSPADM

## 2020-05-05 RX ORDER — BUPIVACAINE HYDROCHLORIDE 7.5 MG/ML
INJECTION, SOLUTION INTRASPINAL PRN
Status: DISCONTINUED | OUTPATIENT
Start: 2020-05-05 | End: 2020-05-05

## 2020-05-05 RX ORDER — ACETAMINOPHEN 325 MG/1
TABLET ORAL
Status: COMPLETED
Start: 2020-05-05 | End: 2020-05-05

## 2020-05-05 RX ORDER — AMOXICILLIN 250 MG
1 CAPSULE ORAL 2 TIMES DAILY
Status: DISCONTINUED | OUTPATIENT
Start: 2020-05-05 | End: 2020-05-05

## 2020-05-05 RX ORDER — KETOROLAC TROMETHAMINE 30 MG/ML
30 INJECTION, SOLUTION INTRAMUSCULAR; INTRAVENOUS EVERY 6 HOURS
Status: DISCONTINUED | OUTPATIENT
Start: 2020-05-05 | End: 2020-05-05

## 2020-05-05 RX ORDER — OXYCODONE HYDROCHLORIDE 5 MG/1
5 TABLET ORAL EVERY 4 HOURS PRN
Status: DISCONTINUED | OUTPATIENT
Start: 2020-05-05 | End: 2020-05-07 | Stop reason: HOSPADM

## 2020-05-05 RX ORDER — CITRIC ACID/SODIUM CITRATE 334-500MG
SOLUTION, ORAL ORAL
Status: DISCONTINUED
Start: 2020-05-05 | End: 2020-05-05 | Stop reason: HOSPADM

## 2020-05-05 RX ORDER — OXYTOCIN 10 [USP'U]/ML
10 INJECTION, SOLUTION INTRAMUSCULAR; INTRAVENOUS
Status: DISCONTINUED | OUTPATIENT
Start: 2020-05-05 | End: 2020-05-07 | Stop reason: HOSPADM

## 2020-05-05 RX ORDER — OXYTOCIN/0.9 % SODIUM CHLORIDE 30/500 ML
100 PLASTIC BAG, INJECTION (ML) INTRAVENOUS CONTINUOUS
Status: DISCONTINUED | OUTPATIENT
Start: 2020-05-05 | End: 2020-05-07 | Stop reason: HOSPADM

## 2020-05-05 RX ORDER — BISACODYL 10 MG
10 SUPPOSITORY, RECTAL RECTAL DAILY PRN
Status: DISCONTINUED | OUTPATIENT
Start: 2020-05-07 | End: 2020-05-05

## 2020-05-05 RX ORDER — ACETAMINOPHEN 325 MG/1
975 TABLET ORAL EVERY 6 HOURS
Status: DISCONTINUED | OUTPATIENT
Start: 2020-05-05 | End: 2020-05-07 | Stop reason: HOSPADM

## 2020-05-05 RX ORDER — ACETAMINOPHEN 325 MG/1
650 TABLET ORAL EVERY 4 HOURS PRN
Status: DISCONTINUED | OUTPATIENT
Start: 2020-05-08 | End: 2020-05-07 | Stop reason: HOSPADM

## 2020-05-05 RX ORDER — OXYCODONE HYDROCHLORIDE 5 MG/1
5 TABLET ORAL EVERY 4 HOURS PRN
Status: DISCONTINUED | OUTPATIENT
Start: 2020-05-05 | End: 2020-05-05

## 2020-05-05 RX ORDER — CEFAZOLIN SODIUM 2 G/100ML
INJECTION, SOLUTION INTRAVENOUS
Status: DISCONTINUED
Start: 2020-05-05 | End: 2020-05-05 | Stop reason: HOSPADM

## 2020-05-05 RX ADMIN — CEFAZOLIN SODIUM 2 G: 2 INJECTION, SOLUTION INTRAVENOUS at 08:33

## 2020-05-05 RX ADMIN — ACETAMINOPHEN 975 MG: 325 TABLET, FILM COATED ORAL at 16:49

## 2020-05-05 RX ADMIN — PHENYLEPHRINE HYDROCHLORIDE 0.5 MCG/KG/MIN: 10 INJECTION INTRAVENOUS at 08:42

## 2020-05-05 RX ADMIN — KETOROLAC TROMETHAMINE 30 MG: 30 INJECTION, SOLUTION INTRAMUSCULAR at 13:21

## 2020-05-05 RX ADMIN — SODIUM CHLORIDE, POTASSIUM CHLORIDE, SODIUM LACTATE AND CALCIUM CHLORIDE: 600; 310; 30; 20 INJECTION, SOLUTION INTRAVENOUS at 05:30

## 2020-05-05 RX ADMIN — ONDANSETRON 4 MG: 2 INJECTION INTRAMUSCULAR; INTRAVENOUS at 08:36

## 2020-05-05 RX ADMIN — Medication 340 ML/HR: at 09:00

## 2020-05-05 RX ADMIN — KETOROLAC TROMETHAMINE 30 MG: 30 INJECTION, SOLUTION INTRAMUSCULAR at 19:20

## 2020-05-05 RX ADMIN — FENTANYL CITRATE 15 MCG: 50 INJECTION, SOLUTION INTRAMUSCULAR; INTRAVENOUS at 08:42

## 2020-05-05 RX ADMIN — ACETAMINOPHEN 975 MG: 325 TABLET, FILM COATED ORAL at 10:27

## 2020-05-05 RX ADMIN — MORPHINE SULFATE 0.2 MG: 0.5 INJECTION, SOLUTION EPIDURAL; INTRATHECAL; INTRAVENOUS at 08:42

## 2020-05-05 RX ADMIN — SENNOSIDES AND DOCUSATE SODIUM 1 TABLET: 8.6; 5 TABLET ORAL at 13:22

## 2020-05-05 RX ADMIN — SODIUM CITRATE AND CITRIC ACID MONOHYDRATE 30 ML: 500; 334 SOLUTION ORAL at 08:30

## 2020-05-05 RX ADMIN — BUPIVACAINE HYDROCHLORIDE IN DEXTROSE 1.6 ML: 7.5 INJECTION, SOLUTION SUBARACHNOID at 08:42

## 2020-05-05 RX ADMIN — ACETAMINOPHEN 975 MG: 325 TABLET, FILM COATED ORAL at 22:59

## 2020-05-05 RX ADMIN — SENNOSIDES AND DOCUSATE SODIUM 1 TABLET: 8.6; 5 TABLET ORAL at 20:28

## 2020-05-05 RX ADMIN — Medication 100 ML/HR: at 13:22

## 2020-05-05 ASSESSMENT — ACTIVITIES OF DAILY LIVING (ADL)
RETIRED_COMMUNICATION: 0-->UNDERSTANDS/COMMUNICATES WITHOUT DIFFICULTY
AMBULATION: 0-->INDEPENDENT
COGNITION: 0 - NO COGNITION ISSUES REPORTED
FALL_HISTORY_WITHIN_LAST_SIX_MONTHS: NO
BATHING: 0-->INDEPENDENT
RETIRED_EATING: 0-->INDEPENDENT
SWALLOWING: 0-->SWALLOWS FOODS/LIQUIDS WITHOUT DIFFICULTY
DRESS: 0-->INDEPENDENT
TRANSFERRING: 0-->INDEPENDENT
TOILETING: 0-->INDEPENDENT

## 2020-05-05 ASSESSMENT — MIFFLIN-ST. JEOR: SCORE: 1781.46

## 2020-05-05 NOTE — PLAN OF CARE
Data: Ivan Gilman transferred to postpartum room 404 via bed at 1138. Infants transferred via mothers arms  Action: Receiving unit notified of transfer: Yes. Patient and family notified of room change. Report given to SINCERE Moreau at 1142. Belongings sent to receiving unit. Accompanied by Registered Nurse. Oriented patient to surroundings. Call light within reach. ID bands double-checked with receiving RN.  Response: Patient tolerated transfer and is stable.

## 2020-05-05 NOTE — OP NOTE
Procedure Date: 2020      PREOPERATIVE DIAGNOSES:  A 37-5/7 weeks' gestation, twins, breech presentation and cholestasis in pregnancy.      POSTOPERATIVE DIAGNOSES:  A 37-5/7 weeks' gestation, twins, breech presentation and cholestasis in pregnancy.       PROCEDURE:  Primary low transverse  section.      SURGEON:  Patrice Garcia MD      ANESTHESIA:  Spinal.       QUANTITATIVE BLOOD LOSS:  Pending.      INDICATIONS AND CONSENT:  This patient is a 35-year-old  1, now para 1 with a twin gestation complicated by cholestasis at 37-5/7 weeks as well as a breech-breech presentation.  The risks and benefits of surgery were discussed in detail, including risks of bleeding, infection, injury to bowels, bladder, anesthetic complications, perioperative risks and blood transfusions.  The risks for increase were discussed given the nature of multiple gestation.      DESCRIPTION OF PROCEDURE:  The patient was taken to the operating room, where spinal anesthesia was administered.  She was placed in supine position.  The abdomen, perineum and vagina prepped and draped in the usual sterile fashion.  Abreu catheter was sterilely inserted.  Pfannenstiel skin incision was made and carried out to the fascia which was sharply incised, extended laterally, dissected superiorly and inferiorly from the rectus.  The rectus  in the midline, peritoneal cavity sharply entered.  Low transverse uterine incision was made and extended with bandage scissors.  The membranes were ruptured for baby A and clear fluids.  The infant was delivered in the footling breech presentation using the cardinal breech maneuvers and the Njndvumzt-Nczschm-Qqln maneuver for the aftercoming head.  The infant was suctioned on the abdomen.  Delayed cord clamping was employed.  The cord was clamped and cut and the infant was taken to the Isolette.  This was a vigorous female infant with Apgars of 9 and 9.  The second infant membranes were  ruptured for clear fluid.  This infant was also delivered in the footling breech presentation using the standard breech cardinal maneuvers and the Gvfetkmtn-Vzvrihq-Buhb maneuver for the aftercoming head.  Delayed cord clamping was also employed.  This was a vigorous male infant with Apgars of 9 and 9.  The placentas were manually removed, the uterus was examined and emptied with a sponge and closed with 2-layer closure of 0 Monocryl.  The pelvis was inspected and the uterine incision was inspected and noted to be hemostatic.  Subfascial space was noted to be hemostatic and the fascia was closed with 0 PDS.  Subcutaneous tissue was reapproximated with 3-0 Vicryl and the skin was closed with Insorb subcuticular staple.  All sponge and instrument counts were reported to me as correct.  The total blood loss by QBL is pending.  There were no complications of the procedure and the mother and babies are recovering well upon my departure.         LEIDY OVERTON MD             D: 2020   T: 2020   MT: KRISTEN      Name:     NAKIA MUÑIZ   MRN:      [id number]        Account:        [id number]   :      1985           Procedure Date: 2020      Document: [id number]

## 2020-05-05 NOTE — ANESTHESIA CARE TRANSFER NOTE
Patient: Ivan Gilman    Procedure(s):  PRIMARY  SECTION (TWINS)    Diagnosis: Breech birth [O32.1XX0]  Diagnosis Additional Information: No value filed.    Anesthesia Type:   Spinal     Note:  Airway :Room Air  Patient transferred to:PACU  Comments: Anesthesia Care Note    Patient: Ivan Gilman    Transferred to: PACU    Patient vital signs: Stable    Airway: None    Monitors placed. VSS. PIV patent. No change in dentition. Report given to JONATAN Paniagua CRNA   2020  Handoff Report: Identifed the Patient, Identified the Reponsible Provider, Reviewed the pertinent medical history, Discussed the surgical course, Reviewed Intra-OP anesthesia mangement and issues during anesthesia, Set expectations for post-procedure period and Allowed opportunity for questions and acknowledgement of understanding      Vitals: (Last set prior to Anesthesia Care Transfer)    CRNA VITALS  2020 0853 - 2020 0930      2020             NIBP:  100/63    NIBP Mean:  69    Resp Rate (set):  10                Electronically Signed By: WANDER Castro CRNA  May 5, 2020  9:30 AM

## 2020-05-05 NOTE — LACTATION NOTE
Initial Lactation visit with Ivan, significant other Rabia & twin babies. Ivan shared babies  well after birth and did well with RN support, but since then have been sleepy and spitty at times. Ivan has been able to hand express drops of colostrum and encouraged her to continue to do so. Encouraged frequent skin to skin. Discussed starting pumping this evening if babies are still sleepy and not feeding well at 12 hour post-delivery, especially due to twin delivery. Jayluis carlos appreciative of information.    Recommend unlimited, frequent breast feedings: At least 8 - 12 times every 24 hours. Recommended rooming in. Instructed in hand expression. Avoid pacifiers and supplementation with formula unless medically indicated. Explained benefits of holding baby skin on skin to help promote better breastfeeding outcomes. Ivan & Rabia appreciative of visit. Will revisit as needed.    Tonia Gonzalez, RN-C, IBCLC, MNN, PHN, BSN

## 2020-05-05 NOTE — ANESTHESIA POSTPROCEDURE EVALUATION
Patient: Ivan Gilman    Procedure(s):  PRIMARY  SECTION (TWINS)    Diagnosis:Breech birth [O32.1XX0]  Diagnosis Additional Information: No value filed.    Anesthesia Type:  Spinal    Note:  Anesthesia Post Evaluation    Patient location during evaluation: OB PACU  Patient participation: Able to fully participate in evaluation  Level of consciousness: awake and alert  Pain management: adequate  Airway patency: patent  Cardiovascular status: hemodynamically stable  Respiratory status: acceptable and room air  Hydration status: acceptable  PONV: none     Anesthetic complications: None    Comments: Patient doing well.  Spinal regressing appropriately.  No patient complaints/concerns.  Pain well controlled.        Last vitals:  Vitals:    20 1200 20 1230 20 1300   BP: 119/80 133/82 122/74   Pulse: (!) 46 (!) 46 52   Resp: 16 16 16   Temp: 36.5  C (97.7  F)     SpO2: 99% 100% 100%         Electronically Signed By: Fernando Harper MD  May 5, 2020  1:48 PM

## 2020-05-05 NOTE — PLAN OF CARE
0501  Patient arrived to maternal assessment center via wheelchair with spouse, Tay.    Patient reports reason for visit is to have .  Patient to MAC room 3 to change into gown.      G 1 P 0     37 weeks 5 days gestation    Prenatal record reviewed.        Verbal consent for EFM.

## 2020-05-05 NOTE — ANESTHESIA PROCEDURE NOTES
Procedure note : intrathecal  Staff -   Anesthesiologist:  Fernando Harper MD      Performed By: Anesthesiologist        Pre-Procedure  Performed by Fernando Harper MD  Location: OB      Pre-Anesthestic Checklist: patient identified, IV checked, site marked, risks and benefits discussed, informed consent, monitors and equipment checked, pre-op evaluation, at physician/surgeon's request and post-op pain management    Timeout  Correct Patient: Yes   Correct Procedure: Yes   Correct Site: Yes   Correct Laterality: Yes   Correct Position: Yes   Site Marked: Yes   .   Procedure Documentation    .    Procedure: intrathecal, .   Patient Position:sitting Insertion Site:L3-4  (midline approach)     Patient Prep/Sterile Barriers; povidone-iodine 7.5% surgical scrub.  .  Needle:  Spinal Needle (gauge): 25  Spinal/LP Needle Length (inches): 3 # of attempts: 1 and # of redirects:  Introducer used Introducer: 20 G .        Assessment/Narrative  Paresthesias: No.  .  .  clear CSF fluid removed . Time Injected:while sitting   Comments:  Patient sitting on edge of OR bed, lower back cleaned and prepped in sterile fashion with betadine. 1% lido used to numb area. Introducer placed, spinal needle through introducer. Appropriate flow of CSF and confirmed with aspiration via syringe. Spinal dose given, 1.6 mg 0.75% bupivacaine with dextrose, 200mcg morphine, 15mcg fentanyl. No complications.

## 2020-05-05 NOTE — PLAN OF CARE
Pt. admitted from L&D via bed.. Pt. arrived with baby and was accompanied by Tara Maguire RN and spouse arrived with personal belongings. Report was taken from Tara POST in L&D.  Pt. is A&Ox3 and VS Are WNL but heart rate goes down to 45.  Oxygen is 100% on RA. Fundus is firm and midline.  Vaginal bleeding is scant.   Pt. c/o 1/10 pain. Pt. denied  nausea, CP, SOB, lightheadedness, or dizziness. LS clear and BS very faint. PIV patent and infusing.  Abrue patent and draining.  Dressing to lower abdomen CDI.  Pneumoboots in place to BLE.  Pt. oriented to the room and call light system.

## 2020-05-05 NOTE — ANESTHESIA PREPROCEDURE EVALUATION
Anesthesia Pre-Procedure Evaluation    Patient: Ivan Gilman   MRN: [id number] : 1985          Preoperative Diagnosis: Breech birth [O32.1XX0]    Procedure(s):  PRIMARY  SECTION (TWINS)    Past Medical History:   Diagnosis Date     Cholestasis of pregnancy 2020     Past Surgical History:   Procedure Laterality Date     CYSTECTOMY OVARIAN BENIGN N/A      LAPAROSCOPIC CYSTECTOMY OVARIAN (BENIGN) Left 2018     wisdom teeth N/A        Anesthesia Evaluation     . Pt has had prior anesthetic. Type: General           ROS/MED HX    ENT/Pulmonary:  - neg pulmonary ROS    (-) sleep apnea   Neurologic:  - neg neurologic ROS     Cardiovascular:  - neg cardiovascular ROS       METS/Exercise Tolerance:     Hematologic:  - neg hematologic  ROS       Musculoskeletal:         GI/Hepatic: Comment: History of cholestasis of pregnancy and hyperemesis       (-) GERD   Renal/Genitourinary:  - ROS Renal section negative       Endo:  - neg endo ROS       Psychiatric:         Infectious Disease:         Malignancy:         Other:                          Physical Exam  Normal systems: dental    Airway   Mallampati: II  TM distance: >3 FB  Neck ROM: full    Dental     Cardiovascular   Rhythm and rate: regular      Pulmonary    breath sounds clear to auscultation            Lab Results   Component Value Date    WBC 7.7 2020    HGB 12.9 2020    HCT 36.1 2020     2020     2018    POTASSIUM 3.5 2018    CHLORIDE 106 2018    CO2 25 2018    BUN 9 2018    CR 0.82 2018    GLC 86 2018    AISLINN 8.7 2018    ALBUMIN 4.2 2018    PROTTOTAL 7.0 2018    ALT 16 2018    AST 15 2018    ALKPHOS 48 2018    BILITOTAL 1.4 (H) 2018    LIPASE 83 2018    HCG Negative 2018       Preop Vitals  BP Readings from Last 3 Encounters:   19 135/85   18 138/74   18 108/54    Pulse Readings from Last 3  "Encounters:   05/20/19 113   01/21/18 90      Resp Readings from Last 3 Encounters:   05/20/19 16   09/02/18 16    SpO2 Readings from Last 3 Encounters:   05/20/19 100%   09/02/18 97%   01/21/18 97%      Temp Readings from Last 1 Encounters:   05/20/19 36.8  C (98.2  F) (Oral)    Ht Readings from Last 1 Encounters:   05/05/20 1.848 m (6' 0.75\")      Wt Readings from Last 1 Encounters:   05/05/20 95.3 kg (210 lb)    Estimated body mass index is 27.9 kg/m  as calculated from the following:    Height as of this encounter: 1.848 m (6' 0.75\").    Weight as of this encounter: 95.3 kg (210 lb).       Anesthesia Plan      History & Physical Review  History and physical reviewed and following examination; no interval change.    ASA Status:  2 .    NPO Status:  > 8 hours    Plan for Spinal   PONV prophylaxis:  Ondansetron (or other 5HT-3)  Duramorph and fentanyl for spinal        Postoperative Care  Postoperative pain management:  Neuraxial analgesia.      Consents  Anesthetic plan, risks, benefits and alternatives discussed with:  Patient..                 Roberto Montes MD  "

## 2020-05-06 LAB
COPATH REPORT: NORMAL
HGB BLD-MCNC: 10.5 G/DL (ref 11.7–15.7)

## 2020-05-06 PROCEDURE — 85018 HEMOGLOBIN: CPT | Performed by: OBSTETRICS & GYNECOLOGY

## 2020-05-06 PROCEDURE — 25000128 H RX IP 250 OP 636: Performed by: OBSTETRICS & GYNECOLOGY

## 2020-05-06 PROCEDURE — 25000132 ZZH RX MED GY IP 250 OP 250 PS 637: Performed by: OBSTETRICS & GYNECOLOGY

## 2020-05-06 PROCEDURE — 36415 COLL VENOUS BLD VENIPUNCTURE: CPT | Performed by: OBSTETRICS & GYNECOLOGY

## 2020-05-06 PROCEDURE — 12000035 ZZH R&B POSTPARTUM

## 2020-05-06 RX ADMIN — KETOROLAC TROMETHAMINE 30 MG: 30 INJECTION, SOLUTION INTRAMUSCULAR at 02:21

## 2020-05-06 RX ADMIN — ACETAMINOPHEN 975 MG: 325 TABLET, FILM COATED ORAL at 04:24

## 2020-05-06 RX ADMIN — IBUPROFEN 800 MG: 400 TABLET, FILM COATED ORAL at 23:14

## 2020-05-06 RX ADMIN — ACETAMINOPHEN 975 MG: 325 TABLET, FILM COATED ORAL at 10:37

## 2020-05-06 RX ADMIN — IBUPROFEN 800 MG: 400 TABLET, FILM COATED ORAL at 09:06

## 2020-05-06 RX ADMIN — IBUPROFEN 800 MG: 400 TABLET, FILM COATED ORAL at 15:36

## 2020-05-06 RX ADMIN — ACETAMINOPHEN 975 MG: 325 TABLET, FILM COATED ORAL at 17:10

## 2020-05-06 RX ADMIN — ACETAMINOPHEN 975 MG: 325 TABLET, FILM COATED ORAL at 23:14

## 2020-05-06 RX ADMIN — SENNOSIDES AND DOCUSATE SODIUM 1 TABLET: 8.6; 5 TABLET ORAL at 09:06

## 2020-05-06 RX ADMIN — SENNOSIDES AND DOCUSATE SODIUM 1 TABLET: 8.6; 5 TABLET ORAL at 20:55

## 2020-05-06 NOTE — PLAN OF CARE
Vital signs stable. Fundus firm midline at U/1, scant flow. Incision dressing intact. Abreu removed at 0430, IV saline locked. Lung sounds clear and equal. Taking Toradol and tylenol  for pain management. Up ambulated in room without  dizziness. Working on breastfeeding every 2-3 hours, assisted with positioning, latch verified. Encouraged to call with questions/concerns. Will continue to monitor.

## 2020-05-06 NOTE — PLAN OF CARE
Vital signs are WNL, she denies pain and is tolerating a regular diet.  Her fundus is firm and midline with light to moderate lochia.  Oxygen has bee %  Patient is no longer bradycardic.  She was up with stand by assist and denied dizziness.  Abreu is patent and draining well.  She and spouse are working on all infant cares.

## 2020-05-06 NOTE — PLAN OF CARE
Vss, fundus firm with scant flow. Tegaderm intact. Pain managed with tylenol and ibuprofen, declines the need for oxycodone today. Pt up independently in room. Voiding spontaneously. Tolerating regular diet and good po intake. Breast feeding  well, pumped x 1 and was able express colostrum. Spouse at bedside and supportive. Encouraged to call with questions/needs.

## 2020-05-06 NOTE — PROGRESS NOTES
[facility]   Obstetrics Post-Op / Progress Note         Interval History:   Doing well.  Pain is well-controlled.  No fevers.  No history of wound drainage, warmth or significant erythema.  Good appetite.  Denies chest pain, shortness of breath, nausea or vomiting.  Ambulatory.  Breastfeeding well.          Significant Problems:             Medications:   All medications related to the patient's surgery have been reviewed          Physical Exam:   All vitals stable    EXAM:  Constitutional: healthy, alert, no distress.   Abdomen: Abdomen soft, non-tender. BS normal. No masses, fundus is firm.  Incision: Clean, dry and intact, no erythema or induration.  JOINT/EXTREMITIES: extremities normal- no calf tenderness          Data:   All laboratory data related to this surgery reviewed  Lab Results   Component Value Date    HGB 10.5 (L) 2020            Assessment and Plan:    Assessment:   Post-operative day #1  Low transverse primary  section for TWINS         No immediate surgical complications identified.      Plan:   Ambulation encouraged         Patrice Garcia MD

## 2020-05-06 NOTE — LACTATION NOTE
Routine visit with RAMAN Love and twins. RN called LC into room to assist with feeding. Twin A sleepy and spitting up. Twin B breastfeeding on left breast in football hold. Deep latch with nutritive suck pattern noted. Twin A put to breast when done spitting. Sleepy and not interested. Left skin to skin with Ivan. Pt plans to rent hospital grade pump prior to discharge. RN in room and updated. Will continue to follow as needed.  CHAPARRITA Kumar RN, BSN, PHN, IBCLC

## 2020-05-07 VITALS
WEIGHT: 210 LBS | BODY MASS INDEX: 28.44 KG/M2 | RESPIRATION RATE: 16 BRPM | HEART RATE: 63 BPM | HEIGHT: 72 IN | OXYGEN SATURATION: 99 % | SYSTOLIC BLOOD PRESSURE: 128 MMHG | TEMPERATURE: 97.6 F | DIASTOLIC BLOOD PRESSURE: 79 MMHG

## 2020-05-07 PROCEDURE — 25000132 ZZH RX MED GY IP 250 OP 250 PS 637: Performed by: OBSTETRICS & GYNECOLOGY

## 2020-05-07 RX ORDER — OXYCODONE HYDROCHLORIDE 5 MG/1
5 TABLET ORAL EVERY 4 HOURS PRN
Qty: 20 TABLET | Refills: 0 | Status: SHIPPED | OUTPATIENT
Start: 2020-05-07 | End: 2023-03-28

## 2020-05-07 RX ADMIN — ACETAMINOPHEN 975 MG: 325 TABLET, FILM COATED ORAL at 05:26

## 2020-05-07 RX ADMIN — ACETAMINOPHEN 975 MG: 325 TABLET, FILM COATED ORAL at 10:46

## 2020-05-07 RX ADMIN — SENNOSIDES AND DOCUSATE SODIUM 1 TABLET: 8.6; 5 TABLET ORAL at 08:48

## 2020-05-07 RX ADMIN — IBUPROFEN 800 MG: 400 TABLET, FILM COATED ORAL at 05:26

## 2020-05-07 RX ADMIN — IBUPROFEN 800 MG: 400 TABLET, FILM COATED ORAL at 10:46

## 2020-05-07 NOTE — PLAN OF CARE
Discharge instructions and follow up reviewed with patient. All questions answered at this time. Patient will be discharged, but stay in room with babies who will stay until tomorrow.

## 2020-05-07 NOTE — PROGRESS NOTES
[facility]   Obstetrics Progress Note    Subjective: This is the patient's second day since  delivery. She is doing well.  She is urinating on her own. Pain is controlled with medication. Twins are doing well, rooming in and breast feeding.     Objective:   All vitals stable  Temp: 97.6  F (36.4  C) Temp src: Oral BP: 128/79 Pulse: 63 Heart Rate: 76 Resp: 16          EXAM:  Constitutional: healthy, alert, no distress.   Abdomen: Abdomen soft, non-tender. BS normal. No masses, fundus is firm. Mild erythema in area of surgical prep.   JOINT/EXTREMITIES: extremities normal   Incision: clean, dry and intact    Last hemoglobin was   Hemoglobin   Date Value Ref Range Status   2020 10.5 (L) 11.7 - 15.7 g/dL Final   ]    Assessment: Stable postpartum course.    Plan: Routine care. Ambulation encouraged  Breast feeding strategies discussed  Pain control measures as needed  Reportable signs and symptoms dicussed with the patient  Discharge later today    Sarah Vega MD

## 2020-05-07 NOTE — PLAN OF CARE
VSS. Voiding adequately on own. Scant vag bleeding. Incision covered w/ tegaderm, scant drainage. Pain controlled w/ ibuprofen and tylenol. Breast feeding well w/ some set up assistance.

## 2020-05-07 NOTE — LACTATION NOTE
Follow up Lactation visit with Ivan, significant other Rabia. Getting ready for discharge. Ivan reports feeding is going ok, feeding both babies on demand and they've been cluster feeding. Ivan also started pumping this morning and supplemented baby with her milk.  Reviewed milk supply and engorgement. Reviewed typical timeline of milk supply initiation and progression over first 3-5 days postpartum. Discussed comfort measures for engorgement, plugged duct treatment, and warning signs of breast infection. Discussed pumping after feedings as able to supplement babies with her milk until pediatrician's follow up appointment. Primary RN assisted in getting Ivan a hospital grade rental pump for discharge, as well. Reviewed pumping at home, hands on pumping.    Feeding plan: Recommend unlimited, frequent breast feedings: At least 8 - 12 times every 24 hours. Avoid pacifiers and supplementation with formula unless medically indicated. Encouraged use of feeding log and to record feedings, and void/stool patterns. Ivan has a pump for home use. Follow up with Wilber Angel, encouraged Lactation follow up within the week due to twin babies breastfeeding. Reviewed outpatient lactation resources. Ivan & Rabia appreciative of visit.    Tonia Gonzalez, RN-C, IBCLC, MNN, PHN, BSN

## 2020-05-12 NOTE — DISCHARGE SUMMARY
This patient had an uncomplicated  and was discharged in good condition with her infants. Refer to chart for additional details.

## 2021-01-03 ENCOUNTER — HEALTH MAINTENANCE LETTER (OUTPATIENT)
Age: 36
End: 2021-01-03

## 2021-10-10 ENCOUNTER — HEALTH MAINTENANCE LETTER (OUTPATIENT)
Age: 36
End: 2021-10-10

## 2022-01-28 ENCOUNTER — OFFICE VISIT (OUTPATIENT)
Dept: FAMILY MEDICINE | Facility: CLINIC | Age: 37
End: 2022-01-28
Payer: COMMERCIAL

## 2022-01-28 VITALS
OXYGEN SATURATION: 99 % | BODY MASS INDEX: 23.03 KG/M2 | TEMPERATURE: 98.1 F | DIASTOLIC BLOOD PRESSURE: 79 MMHG | HEART RATE: 78 BPM | RESPIRATION RATE: 16 BRPM | SYSTOLIC BLOOD PRESSURE: 126 MMHG | HEIGHT: 72 IN | WEIGHT: 170 LBS

## 2022-01-28 DIAGNOSIS — H00.021 HORDEOLUM INTERNUM OF RIGHT UPPER EYELID: Primary | ICD-10-CM

## 2022-01-28 PROCEDURE — 99213 OFFICE O/P EST LOW 20 MIN: CPT | Mod: GC | Performed by: STUDENT IN AN ORGANIZED HEALTH CARE EDUCATION/TRAINING PROGRAM

## 2022-01-28 RX ORDER — CX-024414 0.2 MG/ML
INJECTION, SUSPENSION INTRAMUSCULAR
COMMUNITY
Start: 2021-12-13 | End: 2023-03-31

## 2022-01-28 RX ORDER — RNA INGREDIENT BNT-162B2 0.23 G/1.8ML
INJECTION, SUSPENSION INTRAMUSCULAR
COMMUNITY
Start: 2021-04-27 | End: 2023-03-31

## 2022-01-28 RX ORDER — ERYTHROMYCIN 5 MG/G
0.5 OINTMENT OPHTHALMIC 4 TIMES DAILY
Qty: 3.5 G | Refills: 1 | Status: SHIPPED | OUTPATIENT
Start: 2022-01-28 | End: 2023-03-31

## 2022-01-28 ASSESSMENT — MIFFLIN-ST. JEOR: SCORE: 1595.11

## 2022-01-28 NOTE — PROGRESS NOTES
"  Assessment & Plan     Hordeolum internum of right upper eyelid  Internal hordeolum with slightly worsening erythema and swelling over the past 4 days. Patient endorses worsening tenderness over the swollen eyelid. Denies any pain with eye movements or vision changes. Denies recent trauma or abrasion to the eye. Exam demonstrates a localized area of fluctuance in the inner corner of right eyelid, with erythema and edema extending along the eyelid. A small white pustule is notable on the upper water line. Some conjunctival injection is present. Exam is most consistent with internal hordeolum of the right upper eyelid. Given severity of patient's symptoms, will treat with topical antibiotics. Less concerned about preseptal cellulitis at this time, but will have patient follow up immediately if she has any worsening in her symptoms. No concern for orbital cellulitis.   - erythromycin (ROMYCIN) 5 MG/GM ophthalmic ointment; Place 0.5 inches into the right eye 4 times daily  - use warm compress on affected lid 4 times daily      Return in about 1 week (around 2/4/2022).    Amanda Avendano MD  [facility] 36 [facility] who presents for the following health issues     HPI     Worsening erythema and swelling over the past 4 days. Patient endorses worsening tenderness over the swollen eyelid. Denies any pain with eye movements or vision changes. Denies recent trauma or abrasion to the eye. Has been using warm washcloth over affected lid 2-3 times/day.    Review of Systems    ROS: 10 point ROS neg other than the symptoms noted above in the HPI.      Objective    /79   Pulse 78   Temp 98.1  F (36.7  C) (Oral)   Resp 16   Ht 1.848 m (6' 0.76\")   Wt 77.1 kg (170 lb)   SpO2 99%   BMI 22.58 kg/m    Body mass index is 22.58 kg/m .  Physical Exam  Vitals reviewed.   Constitutional:       Appearance: Normal appearance.   HENT:      Head: Normocephalic.   Eyes:      General: " Vision grossly intact.         Right eye: Hordeolum present.      Extraocular Movements: Extraocular movements intact.      Conjunctiva/sclera:      Right eye: Right conjunctiva is injected.      Pupils: Pupils are equal, round, and reactive to light.   Cardiovascular:      Rate and Rhythm: Normal rate and regular rhythm.      Heart sounds: Normal heart sounds.   Pulmonary:      Effort: Pulmonary effort is normal.      Breath sounds: Normal breath sounds.   Musculoskeletal:         General: No swelling.   Skin:     General: Skin is warm and dry.   Neurological:      Mental Status: She is alert.   Psychiatric:         Mood and Affect: Mood normal.         Behavior: Behavior normal.         Thought Content: Thought content normal.         Judgment: Judgment normal.

## 2022-01-28 NOTE — PATIENT INSTRUCTIONS
Here is the plan from today's visit    1. Hordeolum internum of right upper eyelid  - erythromycin (ROMYCIN) 5 MG/GM ophthalmic ointment; Place 0.5 inches into the right eye 4 times daily  Dispense: 3.5 g; Refill: 1    Please call or return to clinic if your symptoms don't improve in 2-3 days.    Follow up plan  Return in about 1 week (around 2/4/2022).     Thank you for coming to [facility] today.  Lab Testing:  **If you had lab testing today and your results are reassuring or normal they will be mailed to you or sent through [employer] within 7 days.   **If the lab tests need quick action we will call you with the results.  The phone number we will call with results is # [phone] (home) . If this is not the best number please call our clinic and change the number.  Medication Refills:  If you need any refills please call your pharmacy and they will contact us.   If you need to  your refill at a new pharmacy, please contact the new pharmacy directly. The new pharmacy will help you get your medications transferred faster.   Scheduling:  If you have any concerns about today's visit or wish to schedule another appointment please call our office during normal business hours [phone] (8-5:00 M-F)   eferrals to [facility] please call [phone].   Mammogram Scheduling [phone]     XRay/CT/Ultrasound/MRI Scheduling [phone]    Medical Concerns:  If you have urgent medical concerns please call [phone] at any time of the day.    Amanda Avendano MD        Patient Education     Sty (or Stye)  A sty is when the oil gland of the eyelid becomes inflamed. It may develop into an infection with a small pocket of pus (an abscess). This can cause pain, redness, and swelling. In early stages, a sty is treated with antibiotic cream, eye drops, or a small towel soaked in warm water (a warm compress). More severe cases may need to be opened and drained by a healthcare provider.    Home care    Eye drops or ointment are often prescribed to treat the infection. Use these as directed.     Artificial tears may also be used to lubricate the eye and make it more comfortable. You can buy these over the counter without a prescription. Talk with your healthcare provider before using any over-the-counter treatment for a sty.    Apply a warm, damp towel to the affected area for at least 5 minutes, 3 to 4 times a day for a week. Warm compresses open the pores and speed the healing. Make sure the compresses are not too hot, as they may burn your eyelid.    Sometimes the sty will drain with this treatment alone. If this happens, keep using the antibiotic until all the redness and swelling are gone.    Wash your hands before and after touching the infected eyelid.    Don t squeeze or try to break open the sty.    Follow-up care  Follow up with your healthcare provider, or as advised.   When to seek medical advice  Call your healthcare provider or seek medical care right away if any of these occur:     Increase in swelling or redness around the eyelid after 48 to 72 hours    Increase in eye pain or the eyelid blisters    Increase in warmth--the eyelid feels hot    Drainage of blood or thick pus from the sty    Blister on the eyelid    Inability to open the eyelid due to swelling    Fever of 100.4 F (38 C) or above, or as directed by your provider    Vision changes    Headache or stiff neck    The sty comes back  Siddhartha last reviewed this educational content on 6/1/2020 2000-2021 [employer]. All rights reserved. This information is not intended as a substitute for professional medical care. Always follow your healthcare professional's instructions.

## 2022-01-30 ASSESSMENT — VISUAL ACUITY: OU: 1

## 2022-07-16 ENCOUNTER — HEALTH MAINTENANCE LETTER (OUTPATIENT)
Age: 37
End: 2022-07-16

## 2022-09-17 ENCOUNTER — HEALTH MAINTENANCE LETTER (OUTPATIENT)
Age: 37
End: 2022-09-17

## 2023-03-28 ENCOUNTER — OFFICE VISIT (OUTPATIENT)
Dept: FAMILY MEDICINE | Facility: CLINIC | Age: 38
End: 2023-03-28
Payer: COMMERCIAL

## 2023-03-28 VITALS
RESPIRATION RATE: 16 BRPM | HEART RATE: 93 BPM | SYSTOLIC BLOOD PRESSURE: 125 MMHG | OXYGEN SATURATION: 99 % | DIASTOLIC BLOOD PRESSURE: 90 MMHG | TEMPERATURE: 97.5 F

## 2023-03-28 DIAGNOSIS — R53.83 FATIGUE, UNSPECIFIED TYPE: ICD-10-CM

## 2023-03-28 DIAGNOSIS — Z20.822 ENCOUNTER FOR LABORATORY TESTING FOR COVID-19 VIRUS: ICD-10-CM

## 2023-03-28 DIAGNOSIS — J02.9 SORE THROAT: Primary | ICD-10-CM

## 2023-03-28 LAB
DEPRECATED S PYO AG THROAT QL EIA: NEGATIVE
ERYTHROCYTE [DISTWIDTH] IN BLOOD BY AUTOMATED COUNT: 12.3 % (ref 10–15)
GROUP A STREP BY PCR: NOT DETECTED
HCT VFR BLD AUTO: 40.1 % (ref 35–47)
HGB BLD-MCNC: 13.8 G/DL (ref 11.7–15.7)
HOLD SPECIMEN: NORMAL
MCH RBC QN AUTO: 31.8 PG (ref 26.5–33)
MCHC RBC AUTO-ENTMCNC: 34.4 G/DL (ref 31.5–36.5)
MCV RBC AUTO: 92 FL (ref 78–100)
PLATELET # BLD AUTO: 237 10E3/UL (ref 150–450)
RBC # BLD AUTO: 4.34 10E6/UL (ref 3.8–5.2)
WBC # BLD AUTO: 6.2 10E3/UL (ref 4–11)

## 2023-03-28 PROCEDURE — U0005 INFEC AGEN DETEC AMPLI PROBE: HCPCS | Performed by: STUDENT IN AN ORGANIZED HEALTH CARE EDUCATION/TRAINING PROGRAM

## 2023-03-28 PROCEDURE — U0003 INFECTIOUS AGENT DETECTION BY NUCLEIC ACID (DNA OR RNA); SEVERE ACUTE RESPIRATORY SYNDROME CORONAVIRUS 2 (SARS-COV-2) (CORONAVIRUS DISEASE [COVID-19]), AMPLIFIED PROBE TECHNIQUE, MAKING USE OF HIGH THROUGHPUT TECHNOLOGIES AS DESCRIBED BY CMS-2020-01-R: HCPCS | Performed by: STUDENT IN AN ORGANIZED HEALTH CARE EDUCATION/TRAINING PROGRAM

## 2023-03-28 PROCEDURE — 99213 OFFICE O/P EST LOW 20 MIN: CPT | Mod: CS | Performed by: STUDENT IN AN ORGANIZED HEALTH CARE EDUCATION/TRAINING PROGRAM

## 2023-03-28 PROCEDURE — 87651 STREP A DNA AMP PROBE: CPT | Performed by: STUDENT IN AN ORGANIZED HEALTH CARE EDUCATION/TRAINING PROGRAM

## 2023-03-28 PROCEDURE — 36415 COLL VENOUS BLD VENIPUNCTURE: CPT | Performed by: STUDENT IN AN ORGANIZED HEALTH CARE EDUCATION/TRAINING PROGRAM

## 2023-03-28 PROCEDURE — 85027 COMPLETE CBC AUTOMATED: CPT | Performed by: STUDENT IN AN ORGANIZED HEALTH CARE EDUCATION/TRAINING PROGRAM

## 2023-03-28 NOTE — PROGRESS NOTES
Assessment & Plan     (J02.9) Sore throat  (primary encounter diagnosis)  Comment: labs collected-will contact the patient through my chart  Plan: Streptococcus A Rapid Screen w/Reflex to PCR -         Clinic Collect         (R53.83) Fatigue, unspecified type  Comment: CBC ordered, last Hb 10.8 (5/6/2020).   Plan: CBC with Platelets and Reflex to Iron Studies          (Z20.822) Encounter for laboratory testing for COVID-19 virus  Comment: Covid test collected. Will contact the patient with the results  Plan: Symptomatic COVID-19 Virus (Coronavirus) by PCR        Nose               Follow up with any concerns or if the symptoms worsen    Maren Sosa student CNM on 3/28/2023 at 10:55 AM    I, Rodrigue Moreland DO, was present with the medical student who participated in the service and in the documentation of the note.  I have verified the history and personally performed the physical exam and medical decision making, and have verified the content of the note, which accurately reflects me assessment of the plan of care as documented in the note.      --- Testing resulted negative/ reassuring for COVID , strep and CBC. Likely viral illness sequela. Discussed RTC if pt is not improving in 2 weeks.     Ordering of each unique test    Rodrigue Moreland DO  [facility] LENORE Love is a 37 year old, presenting for the following health issues:  Sinus Problem (Patient has been having sinus issues for about 6 weeks; past 7 days has been really hard to swallow at night and in the mornings; workouts caused a lot of fatigue. Works in  so can be sick a lot; family got sick about 6 weeks ago but they've all been feeling better, she still feels sick. )  No flowsheet data found.  HPI     Acute Illness  Acute illness concerns: Sore throat, dysphagia, cough  Onset/Duration: symptoms started on February 10. 2023. Her son and  also had the same symptoms that started as a congestion and had  negative Covid and strep tests. Her symptoms are worsened with exercise. Last 7-10 days she has been experiencing dysphagia. Fever on and off.  Motrin, and  water helps  Symptoms:  Fever: on and off  Chills/Sweats: No  Headache (location?): No  Sinus Pressure: on and off  Conjunctivitis:  Left eye from Friday to Sunday-used a warm wash water. Had a sty one yr ago and used the cream that was prescribed and that helped  Ear Pain: no  Rhinorrhea: YES-clear  Congestion: YES  Sore Throat: YES  Cough: YES - early  Wheeze: No  Decreased Appetite: YES  Nausea: No  Vomiting: No  Diarrhea: No  Dysuria/Freq.: No  Dysuria or Hematuria: No  Fatigue/Achiness: YES  Sick/Strep Exposure: children(twin son and daughter) go to day care.   Therapies tried and outcome: water, motrin      Review of Systems   CONSTITUTIONAL:POSITIVE  for fatigue, fever on and off,  and malaise   ENT/MOUTH: POSITIVE for fever, nasal congestion, rhinorrhea-clear, sinus pressure and sore throat and NEGATIVE for ear pain bilateral and sneezing  RESP:POSITIVE for cough-non productive and NEGATIVE for wheezing  PSYCHIATRIC: NEGATIVE for changes in mood or affect      Objective    BP (!) 125/90   Pulse 93   Temp 97.5  F (36.4  C) (Oral)   Resp 16   SpO2 99%   There is no height or weight on file to calculate BMI.     Physical Exam     General: Alert and oriented, in no acute distress.  Skin: Warm and dry, no abnormalities noted.  Eyes: Extra-ocular muscles grossly intact, pupils equal.  ENT: Speech intact, nasal passages open, no hearing impairment noted.  NECK: enlarged submandibular lymph nodes , no asymmetry, masses, or scars and thyroid normal to palpation  CV: No cyanosis or pallor, warm and well perfused.regular rate and rhythm, normal S1 S2, no S3 or S4, no murmur, click or rub,  Respiratory: No respiratory distress, no accessory muscle use. lungs clear to auscultation - no rales, rhonchi or wheezes  Neuro: Gait and station normal, comprehension  intact. Gross and fine motor skills intact.   Psychiatric: Mood and affect appear normal.   Extremities: Warm, able to move all four extremities at will.      Results for orders placed or performed in visit on 03/28/23   Symptomatic COVID-19 Virus (Coronavirus) by PCR Nose     Status: Normal    Specimen: Nose; Swab   Result Value Ref Range    SARS CoV2 PCR Negative Negative    Narrative    Testing was performed using the jerri SARS-CoV-2 assay on the jerri  [employer] System. This test should be ordered for the detection of  SARS-CoV-2 in individuals who meet SARS-CoV-2 clinical and/or  epidemiological criteria. Test performance is unknown in asymptomatic  patients. This test is for in vitro diagnostic use under the FDA EUA  for laboratories certified under CLIA to perform high and/or moderate  complexity testing. This test has not been FDA cleared or approved. A  negative result does not rule out the presence of PCR inhibitors in  the specimen or target RNA in concentration below the limit of  detection for the assay. The possibility of a false negative should  be considered if the patient's recent exposure or clinical  presentation suggests COVID-19. This test was validated by the [facility]. This  laboratory is certified under the Clinical Laboratory Improvement  Amendments of 1988 (CLIA-88) as qualified to perform high and/or  moderate complexity laboratory testing.   CBC with Platelets and Reflex to Iron Studies     Status: Normal   Result Value Ref Range    WBC Count 6.2 4.0 - 11.0 10e3/uL    RBC Count 4.34 3.80 - 5.20 10e6/uL    Hemoglobin 13.8 11.7 - 15.7 g/dL    Hematocrit 40.1 35.0 - 47.0 %    MCV 92 78 - 100 fL    MCH 31.8 26.5 - 33.0 pg    MCHC 34.4 31.5 - 36.5 g/dL    RDW 12.3 10.0 - 15.0 %    Platelet Count 237 150 - 450 10e3/uL   Extra Green Top (Lithium Heparin) Tube     Status: None   Result Value Ref Range    Hold Specimen JIC    Streptococcus A Rapid  Screen w/Reflex to PCR - Clinic Collect     Status: Normal    Specimen: Throat; Swab   Result Value Ref Range    Group A Strep antigen Negative Negative   Group A Streptococcus PCR Throat Swab     Status: Normal    Specimen: Throat; Swab   Result Value Ref Range    Group A strep by PCR Not Detected Not Detected    Narrative    The Xpert Xpress Strep A test, performed on the [employer], is a rapid, qualitative in vitro diagnostic test for the detection of Streptococcus pyogenes (Group A ß-hemolytic Streptococcus, Strep A) in throat swab specimens from patients with signs and symptoms of pharyngitis. The Xpert Xpress Strep A test can be used as an aid in the diagnosis of Group A Streptococcal pharyngitis. The assay is not intended to monitor treatment for Group A Streptococcus infections. The Xpert Xpress Strep A test utilizes an automated real-time polymerase chain reaction (PCR) to detect Streptococcus pyogenes DNA.   CBC with Platelets and Reflex to Iron Studies     Status: None    Narrative    The following orders were created for panel order CBC with Platelets and Reflex to Iron Studies.  Procedure                               Abnormality         Status                     ---------                               -----------         ------                     CBC with Platelets and R...[[id number]]  Normal              Final result               Extra Green Top (Lithium...[[id number]]                      Final result                 Please view results for these tests on the individual orders.

## 2023-03-29 LAB — SARS-COV-2 RNA RESP QL NAA+PROBE: NEGATIVE

## 2023-04-05 ENCOUNTER — OFFICE VISIT (OUTPATIENT)
Dept: FAMILY MEDICINE | Facility: CLINIC | Age: 38
End: 2023-04-05
Payer: COMMERCIAL

## 2023-04-05 VITALS
HEART RATE: 85 BPM | TEMPERATURE: 97.6 F | DIASTOLIC BLOOD PRESSURE: 80 MMHG | OXYGEN SATURATION: 100 % | RESPIRATION RATE: 16 BRPM | SYSTOLIC BLOOD PRESSURE: 134 MMHG

## 2023-04-05 DIAGNOSIS — J02.0 STREPTOCOCCAL PHARYNGITIS: Primary | ICD-10-CM

## 2023-04-05 DIAGNOSIS — J02.9 SORE THROAT: ICD-10-CM

## 2023-04-05 PROBLEM — O21.1 HYPEREMESIS GRAVIDARUM WITH DEHYDRATION: Status: RESOLVED | Noted: 2019-10-22 | Resolved: 2023-04-05

## 2023-04-05 PROBLEM — N83.209 RUPTURE OF OVARIAN CYST: Status: RESOLVED | Noted: 2018-09-02 | Resolved: 2023-04-05

## 2023-04-05 LAB
DEPRECATED S PYO AG THROAT QL EIA: POSITIVE
MONOCYTES NFR BLD AUTO: NEGATIVE %

## 2023-04-05 PROCEDURE — 36415 COLL VENOUS BLD VENIPUNCTURE: CPT | Performed by: FAMILY MEDICINE

## 2023-04-05 PROCEDURE — 99213 OFFICE O/P EST LOW 20 MIN: CPT | Performed by: FAMILY MEDICINE

## 2023-04-05 PROCEDURE — 87880 STREP A ASSAY W/OPTIC: CPT | Performed by: FAMILY MEDICINE

## 2023-04-05 PROCEDURE — 86308 HETEROPHILE ANTIBODY SCREEN: CPT | Performed by: FAMILY MEDICINE

## 2023-04-05 RX ORDER — PENICILLIN V POTASSIUM 500 MG/1
500 TABLET, FILM COATED ORAL 2 TIMES DAILY
Qty: 20 TABLET | Refills: 0 | Status: SHIPPED | OUTPATIENT
Start: 2023-04-05 | End: 2023-04-15

## 2023-04-05 NOTE — PROGRESS NOTES
Assessment & Plan     (J02.9) Sore throat  (primary encounter diagnosis)  Comment: Ivan's son, daughter and  were diagnosed with strep throat two days ago and although she had a negative test on 3/38/23 she would like another test done today. Discussed the option of presuming strep throat and treating it with antibiotics and/or screening for mononucleosis and strep and she opted for screening first.  Plan: Mononucleosis screen, Streptococcus A Rapid         Screen w/Reflex to PCR - Clinic Collect  Patient called with the results, penicillin prescribed and sent to preferred pharmacy         Results for orders placed or performed in visit on 04/05/23   Mononucleosis screen     Status: Normal   Result Value Ref Range    Mononucleosis Screen Negative Negative   Streptococcus A Rapid Screen w/Reflex to PCR - Clinic Collect     Status: Abnormal    Specimen: Throat; Swab   Result Value Ref Range    Group A Strep antigen Positive (A) Negative         Ordering of each unique test  Prescription drug management         FUTURE APPOINTMENTS:       - Follow-up visit if symptoms do not resolve    Mraen Sosa student LAUREN on 4/5/2023 at 11:30 AM    I was present with the CLEMENTEM student who participated in the service and in the documentation of this note. I have verified the history and personally performed the physical exam and medical decision making, and have verified the content of the note, which accurately reflects my assessment of the patient and the plan of care.   MD Delmis Bill MD  [facility] LENORE Love is a 37 year old, presenting for the following health issues:   RECHECK (Pt reports x 6 weeks she feeing sick. Pt also states family been sick 2 weeks with sore throat. She been feeling tired, body aches, and sore throat.)        3/28/2023    10:10 AM   Additional Questions   Roomed by La   Accompanied by Self     HPI     Acute Illness  Acute  illness concerns: Sore throat, chills, red eye, runny nose, fatigue  Onset/Duration: 8 weeks(Since Feb 10th, 2023)  Symptoms:  Fever: No  Chills/Sweats: yes  Headache (location?): YES-mild  Sinus Pressure: YES  Conjunctivitis:  No  Ear Pain: no  Rhinorrhea: YES  Congestion: No  Sore Throat: YES  Cough: no  Wheeze: No  Decreased Appetite: No  Nausea: No  Vomiting: No  Diarrhea: No  Dysuria/Freq.: No  Dysuria or Hematuria: No  Fatigue/Achiness: YES  Sick/Strep Exposure: YES-, son and daughter tested positive for strep throat  Therapies tried and outcome: hydration and prn motrin      Review of systems  CONSTITUTIONAL:POSITIVE  for chills and fatigue and NEGATIVE  for fever   EYES: POSITIVE for redness bilateral and NEGATIVE for discharge bilateral  ENT/MOUTH: POSITIVE for nasal congestion, rhinorrhea-clear and sore throat and NEGATIVE for ear pain bilateral, enlarged tonsils bilateral and sneezing  RESP:NEGATIVE for significant cough or SOB  MUSCULOSKELETAL: NEGATIVE for significant arthralgias or myalgia  PSYCH:Negative for changes in mood, and affect      Objective    /80 (BP Location: Left arm, Patient Position: Sitting, Cuff Size: Adult Regular)   Pulse 85   Temp 97.6  F (36.4  C) (Oral)   Resp 16   LMP 03/15/2023 (Approximate)   SpO2 100%   There is no height or weight on file to calculate BMI.     Physical Exam   General: Alert and oriented, in no acute distress.  Skin: Warm and dry, no abnormalities noted.  Eyes: Extra-ocular muscles grossly intact, pupils equal.  ENT: Speech intact, nasal passages open, no hearing impairment noted.  CV: No cyanosis or pallor, warm and well perfused.Pulse regular, no murmurs  Respiratory: No respiratory distress, no accessory muscle use.On auscultation lung sounds are clear  Neuro: Gait and station normal, comprehension intact. Gross and fine motor skills intact.   Psychiatric: Mood and affect appear normal.   Extremities: Warm, able to move all four  extremities at will.

## 2023-04-16 ASSESSMENT — ENCOUNTER SYMPTOMS
DYSURIA: 0
DIARRHEA: 0
HEMATURIA: 0
NERVOUS/ANXIOUS: 0
SHORTNESS OF BREATH: 0
FEVER: 0
HEADACHES: 0
ABDOMINAL PAIN: 0
SORE THROAT: 0
COUGH: 0
EYE PAIN: 0
DIZZINESS: 0
HEARTBURN: 0
FREQUENCY: 0
JOINT SWELLING: 0
NAUSEA: 0
ARTHRALGIAS: 0
CHILLS: 0
PALPITATIONS: 0
WEAKNESS: 0
PARESTHESIAS: 0
CONSTIPATION: 0
HEMATOCHEZIA: 0
BREAST MASS: 0
MYALGIAS: 0

## 2023-04-17 ENCOUNTER — OFFICE VISIT (OUTPATIENT)
Dept: FAMILY MEDICINE | Facility: CLINIC | Age: 38
End: 2023-04-17
Payer: COMMERCIAL

## 2023-04-17 VITALS
BODY MASS INDEX: 18.98 KG/M2 | OXYGEN SATURATION: 99 % | WEIGHT: 140.1 LBS | SYSTOLIC BLOOD PRESSURE: 129 MMHG | HEIGHT: 72 IN | HEART RATE: 79 BPM | DIASTOLIC BLOOD PRESSURE: 78 MMHG | RESPIRATION RATE: 16 BRPM | TEMPERATURE: 97.4 F

## 2023-04-17 DIAGNOSIS — R53.83 OTHER FATIGUE: ICD-10-CM

## 2023-04-17 DIAGNOSIS — G56.23 ULNAR NEUROPATHY OF BOTH UPPER EXTREMITIES: ICD-10-CM

## 2023-04-17 DIAGNOSIS — Z00.00 ROUTINE GENERAL MEDICAL EXAMINATION AT A HEALTH CARE FACILITY: Primary | ICD-10-CM

## 2023-04-17 LAB — TSH SERPL DL<=0.005 MIU/L-ACNC: 0.66 UIU/ML (ref 0.3–4.2)

## 2023-04-17 PROCEDURE — 36415 COLL VENOUS BLD VENIPUNCTURE: CPT | Performed by: FAMILY MEDICINE

## 2023-04-17 PROCEDURE — 99395 PREV VISIT EST AGE 18-39: CPT | Performed by: FAMILY MEDICINE

## 2023-04-17 PROCEDURE — 99213 OFFICE O/P EST LOW 20 MIN: CPT | Mod: 25 | Performed by: FAMILY MEDICINE

## 2023-04-17 PROCEDURE — 84443 ASSAY THYROID STIM HORMONE: CPT | Performed by: FAMILY MEDICINE

## 2023-04-17 ASSESSMENT — ENCOUNTER SYMPTOMS
SORE THROAT: 0
DYSURIA: 0
SHORTNESS OF BREATH: 0
COUGH: 0
ARTHRALGIAS: 0
CHILLS: 0
HEMATOCHEZIA: 0
DIARRHEA: 0
ABDOMINAL PAIN: 0
PARESTHESIAS: 0
PALPITATIONS: 0
DIZZINESS: 0
JOINT SWELLING: 0
FREQUENCY: 0
HEARTBURN: 0
BREAST MASS: 0
HEMATURIA: 0
NERVOUS/ANXIOUS: 0
NAUSEA: 0
EYE PAIN: 0
WEAKNESS: 0
HEADACHES: 0
CONSTIPATION: 0
FEVER: 0
MYALGIAS: 0

## 2023-04-17 NOTE — PROGRESS NOTES
SUBJECTIVE:   CC: Ivan is an 37 year old who presents for preventive health visit.       2023     9:28 AM   Additional Questions   Roomed by Delmis   Accompanied by self   Patient has been advised of split billing requirements and indicates understanding: Yes  Healthy Habits:     Getting at least 3 servings of Calcium per day:  Yes    Bi-annual eye exam:  Yes    Dental care twice a year:  Yes    Sleep apnea or symptoms of sleep apnea:  None    Diet:  Regular (no restrictions)    Frequency of exercise:  4-5 days/week    Duration of exercise:  45-60 minutes    Taking medications regularly:  Not Applicable    Medication side effects:  Not applicable    PHQ-2 Total Score: 0    Additional concerns today:  Yes    Fam Hx of colon CA and polyps  - all on dad's side  - 4 people with CA (all grandparent's gen or older - 3 people  from colon CA)  - PGM sister (colon CA 70s d), PGM uncle (colon CA, d 86), PGF uncle (colon CA, d 44)  - polyps in dad and 3 aunts (dad's sisters); 2 1st cousins with polpys in 40s    Sister - thyroid CA, papillary     Fatigue  - does exercise, but just feels worn out  - twins are now almost 3, require a lot of energy    Mirena IUD x 2 years, very light spotting    Working from home  - some numbness/aching in both wrists (R>L), an issue for months  - seems more on the lateral sides (5th and 4th fingers), can feel weak 1st thing in the AM  - currently adjusting home work space to be more ergonomic (including padding for wrists - does tend to lean toward the ulnar side when mousing/typing)    Today's PHQ-2 Score:       2023     9:30 AM   PHQ-2 (  [employer])   Q1: Little interest or pleasure in doing things 0   Q2: Feeling down, depressed or hopeless 0   PHQ-2 Score 0       Have you ever done Advance Care Planning? (For example, a Health Directive, POLST, or a discussion with a medical provider or your loved ones about your wishes): No, advance care planning information given to  patient to review.  Patient plans to discuss their wishes with loved ones or provider.      Social History     Tobacco Use     Smoking status: Never     Passive exposure: Never     Smokeless tobacco: Never   Vaping Use     Vaping status: Never Used   Substance Use Topics     Alcohol use: Not Currently     Comment: socially             2023     1:21 PM   Alcohol Use   Prescreen: >3 drinks/day or >7 drinks/week? No     Reviewed orders with patient.  Reviewed health maintenance and updated orders accordingly - Yes  Patient Active Problem List   Diagnosis     Achilles tendinitis of both lower extremities      delivery delivered     Past Surgical History:   Procedure Laterality Date      SECTION N/A 2020    Procedure: PRIMARY  SECTION (TWINS);  Surgeon: Patrice Garcia MD;  Location:  L+D     CYSTECTOMY OVARIAN BENIGN N/A      LAPAROSCOPIC CYSTECTOMY OVARIAN (BENIGN) Left 2018     wisdom teeth N/A        Social History     Tobacco Use     Smoking status: Never     Passive exposure: Never     Smokeless tobacco: Never   Vaping Use     Vaping status: Never Used   Substance Use Topics     Alcohol use: Not Currently     Comment: socially     Family History   Problem Relation Age of Onset     Depression Mother      Colon Polyps Father      Thyroid Cancer Sister 37     Parkinsonism Maternal Grandmother         in hospice     Hypertension Maternal Grandfather      Breast Cancer Paternal Grandmother 40         in early 40s           Breast Cancer Screening:  Any new diagnosis of family breast, ovarian, or bowel cancer? No    FHS-7:        View : No data to display.                Patient under 40 years of age: Routine Mammogram Screening not recommended.   Pertinent mammograms are reviewed under the imaging tab.    History of abnormal Pap smear: NO - age 30-65 PAP every 5 years with negative HPV co-testing recommended      Latest Ref Rng & Units 2019     4:36 PM  2019    12:37 PM   PAP / HPV   PAP (Historical)   NIL     HPV 16 DNA NEG^Negative Negative      HPV 18 DNA NEG^Negative Negative      Other HR HPV NEG^Negative Negative        Reviewed and updated as needed this visit by clinical staff   Tobacco  Allergies  Meds            Reviewed and updated as needed this visit by Provider                 Past Medical History:   Diagnosis Date     Cholestasis of pregnancy 2020     Rupture of ovarian cyst 2018      OB History    Para Term  AB Living   1 1 1 0 0 2   SAB IAB Ectopic Multiple Live Births   0 0 0 1 2      # Outcome Date GA Lbr Bill/2nd Weight Sex Delivery Anes PTL Lv   1A Term 20 37w5d  2.74 kg (6 lb 0.7 oz) F CS-LTranv   JENNIFER      Name: ANTONINO,FEMALE-A BLISSE      Apgar1: 9  Apgar5: 9   1B Term 20 37w5d  3.45 kg (7 lb 9.7 oz) M CS-LTranv   JENNIFER      Name: ANTONINO,MALE-B BLISSE      Apgar1: 9  Apgar5: 9       Review of Systems   Constitutional: Negative for chills and fever.   HENT: Negative for congestion, ear pain, hearing loss and sore throat.    Eyes: Negative for pain and visual disturbance.   Respiratory: Negative for cough and shortness of breath.    Cardiovascular: Negative for chest pain, palpitations and peripheral edema.   Gastrointestinal: Negative for abdominal pain, constipation, diarrhea, heartburn, hematochezia and nausea.   Breasts:  Negative for tenderness, breast mass and discharge.   Genitourinary: Negative for dysuria, frequency, genital sores, hematuria, pelvic pain, urgency, vaginal bleeding and vaginal discharge.   Musculoskeletal: Negative for arthralgias, joint swelling and myalgias.   Skin: Negative for rash.   Neurological: Negative for dizziness, weakness, headaches and paresthesias.   Psychiatric/Behavioral: Negative for mood changes. The patient is not nervous/anxious.         OBJECTIVE:   /78 (BP Location: Left arm, Patient Position: Sitting, Cuff Size: Adult Regular)   Pulse 79   Temp 97.4  F  "(36.3  C) (Oral)   Resp 16   Ht 1.876 m (6' 1.86\")   Wt 63.5 kg (140 lb 1.6 oz)   LMP 04/10/2023 (Approximate)   SpO2 99%   BMI 18.06 kg/m    Physical Exam  GENERAL: healthy, alert and no distress  EYES: Eyes grossly normal to inspection, PERRL and conjunctivae and sclerae normal  HENT: ear canals and TM's normal, nose and mouth without ulcers or lesions  NECK: no adenopathy, no asymmetry, masses, or scars and thyroid normal to palpation  RESP: lungs clear to auscultation - no rales, rhonchi or wheezes  BREAST: deferred today  CV: regular rate and rhythm, normal S1 S2, no S3 or S4, no murmur, click or rub, no peripheral edema and peripheral pulses strong  ABDOMEN: soft, nontender, no hepatosplenomegaly, no masses and bowel sounds normal  MS: no gross musculoskeletal defects noted, no edema; mild tenderness over the ulnar tunnel at the wrist R>L, normal  strength bilat, neg tenderness/tinel at cubital tunnels bilat; no muscle atrophy noted  SKIN: no suspicious lesions or rashes  NEURO: Normal strength and tone, mentation intact and speech normal  PSYCH: mentation appears normal, affect normal/bright    ASSESSMENT/PLAN:   (Z00.00) Routine general medical examination at a health care facility  (primary encounter diagnosis)  Comment: healthy, active, UTD with health screening, declined Hep C screening (low risk), UTD with immunizations  Plan: Discussed FHx of polyps, colon CA - no direct family member with colon CA; recommend routine screening starting at 45 and recommend colonoscopy due to h/o familial polyps    (R53.83) Other fatigue  Comment: discussed \"life transition\" in early parenting, normalized fatigue and encouraged engagement in activity as well as appropriate rest when needed; low chance of anemia (rare menses due to IUC), recent hgb >12  Plan: TSH with free T4 reflex    (G56.23) Ulnar neuropathy of both upper extremities  Comment: working on ergonomics of home office  Plan: Occupational Therapy " Referral      COUNSELING:  Reviewed preventive health counseling, as reflected in patient instructions        She reports that she has never smoked. She has never been exposed to tobacco smoke. She has never used smokeless tobacco.          Delmis Snyder MD  [facility]

## 2023-12-12 NOTE — TELEPHONE ENCOUNTER
DIAGNOSIS: Self referral for left shin/calf pain from running/ [employer]/ ortho con    APPOINTMENT DATE: 12/27/2023   NOTES STATUS DETAILS   MEDICATION LIST Internal

## 2023-12-27 ENCOUNTER — ANCILLARY PROCEDURE (OUTPATIENT)
Dept: GENERAL RADIOLOGY | Facility: CLINIC | Age: 38
End: 2023-12-27
Attending: FAMILY MEDICINE
Payer: COMMERCIAL

## 2023-12-27 ENCOUNTER — OFFICE VISIT (OUTPATIENT)
Dept: ORTHOPEDICS | Facility: CLINIC | Age: 38
End: 2023-12-27
Payer: COMMERCIAL

## 2023-12-27 ENCOUNTER — PRE VISIT (OUTPATIENT)
Dept: ORTHOPEDICS | Facility: CLINIC | Age: 38
End: 2023-12-27

## 2023-12-27 DIAGNOSIS — M79.662 PAIN OF LEFT LOWER LEG: Primary | ICD-10-CM

## 2023-12-27 DIAGNOSIS — M79.662 PAIN IN LEFT SHIN: Primary | ICD-10-CM

## 2023-12-27 PROCEDURE — 73590 X-RAY EXAM OF LOWER LEG: CPT | Mod: LT | Performed by: RADIOLOGY

## 2023-12-27 PROCEDURE — 99203 OFFICE O/P NEW LOW 30 MIN: CPT | Performed by: FAMILY MEDICINE

## 2023-12-27 NOTE — LETTER
12/27/2023      RE: Ivan Gilman  [address]  [place] [address]     Dear Colleague,    Thank you for referring your patient, Ivan Gilman, to the [facility]. Please see a copy of my visit note below.      [facility]  SPORTS & ORTHOPEDIC CLINIC VISIT     Dec 27, 2023        ASSESSMENT & PLAN    38-year-old with prior history of bone stress injury here with left shin pain that has been unimproved with period of rest since August.  Have concern for stress fracture.  Possible periosteal reaction in the tibia on radiographs    Reviewed imaging and assessment with patient in detail  At this point given her prolonged symptoms we will pursue an MRI for further evaluation    Andrea Salgado MD  [facility]    -----  Chief Complaint   Patient presents with     Left Lower Leg - Pain       SUBJECTIVE  Ivan Gilman is a/an 38 year old female who is seen as a self referral for evaluation of left shin pain.     The patient is seen by themselves.  The patient is Right handed    Onset: August 2023. Patient describes injury as being a runner and was training in August and had some pain so she stopped and rested. Now she is still having pain   Location of Pain: left anterior shin   Worsened by: Jumping, running, biking   Better with: Rest   Treatments tried: rest/activity avoidance, ice, and ibuprofen  Associated symptoms: Pain over anterior shin     Orthopedic/Surgical history: Previous stress fracture on that leg   Social History/Occupation: Health        REVIEW OF SYSTEMS:  Do you have fever, chills, weight loss? No  Do you have any vision problems? No  Do you have any chest pain or edema? No  Do you have any shortness of breath or wheezing?  No  Do you have stomach problems? No  Do you have any numbness or focal weakness? No  Do you have diabetes? No  Do you have problems with bleeding or clotting? No  Do you  have an rashes or other skin lesions? No    OBJECTIVE:  There were no vitals taken for this visit.     General: Alert, pleasant, no distress  Left lower leg: warm, well perfused, SILT throughout     Inspection: No swelling ecchymosis or deformity.     ROM: Symmetric at the knee and ankle without significant discomfort     Strength: Is able to walk on toes and heels without pain.     Palpation: Tenderness over the mid to proximal tibia     Special Tests: No pain with single-leg hop      RADIOLOGY:    3 view xrays of left tib-fib performed and reviewed independently demonstrating possible periosteal reaction over the lateral aspect of the mid tibia which correlates with the region of patient tenderness.  There is a slight lucency in the area that is likely a vascular channel. See EMR for formal radiology report.                Again, thank you for allowing me to participate in the care of your patient.      Sincerely,    Andrea Salgado MD

## 2023-12-27 NOTE — PROGRESS NOTES
[facility]  SPORTS & ORTHOPEDIC CLINIC VISIT     Dec 27, 2023        ASSESSMENT & PLAN    38-year-old with prior history of bone stress injury here with left shin pain that has been unimproved with period of rest since August.  Have concern for stress fracture.  Possible periosteal reaction in the tibia on radiographs    Reviewed imaging and assessment with patient in detail  At this point given her prolonged symptoms we will pursue an MRI for further evaluation    Andrea Salgado MD  [facility] [place]    -----  Chief Complaint   Patient presents with    Left Lower Leg - Pain       SUBJECTIVE  Ivan Gilman is a/an 38 year old female who is seen as a self referral for evaluation of left shin pain.     The patient is seen by themselves.  The patient is Right handed    Onset: August 2023. Patient describes injury as being a runner and was training in August and had some pain so she stopped and rested. Now she is still having pain   Location of Pain: left anterior shin   Worsened by: Jumping, running, biking   Better with: Rest   Treatments tried: rest/activity avoidance, ice, and ibuprofen  Associated symptoms: Pain over anterior shin     Orthopedic/Surgical history: Previous stress fracture on that leg   Social History/Occupation: Health        REVIEW OF SYSTEMS:  Do you have fever, chills, weight loss? No  Do you have any vision problems? No  Do you have any chest pain or edema? No  Do you have any shortness of breath or wheezing?  No  Do you have stomach problems? No  Do you have any numbness or focal weakness? No  Do you have diabetes? No  Do you have problems with bleeding or clotting? No  Do you have an rashes or other skin lesions? No    OBJECTIVE:  There were no vitals taken for this visit.     General: Alert, pleasant, no distress  Left lower leg: warm, well perfused, SILT throughout     Inspection: No swelling ecchymosis or deformity.     ROM:  Symmetric at the knee and ankle without significant discomfort     Strength: Is able to walk on toes and heels without pain.     Palpation: Tenderness over the mid to proximal tibia     Special Tests: No pain with single-leg hop      RADIOLOGY:    3 view xrays of left tib-fib performed and reviewed independently demonstrating possible periosteal reaction over the lateral aspect of the mid tibia which correlates with the region of patient tenderness.  There is a slight lucency in the area that is likely a vascular channel. See EMR for formal radiology report.

## 2024-01-02 ENCOUNTER — ANCILLARY PROCEDURE (OUTPATIENT)
Dept: CT IMAGING | Facility: CLINIC | Age: 39
End: 2024-01-02
Attending: NURSE PRACTITIONER
Payer: COMMERCIAL

## 2024-01-02 ENCOUNTER — OFFICE VISIT (OUTPATIENT)
Dept: FAMILY MEDICINE | Facility: CLINIC | Age: 39
End: 2024-01-02
Payer: COMMERCIAL

## 2024-01-02 ENCOUNTER — OFFICE VISIT (OUTPATIENT)
Dept: PEDIATRICS | Facility: CLINIC | Age: 39
End: 2024-01-02
Payer: COMMERCIAL

## 2024-01-02 VITALS
HEIGHT: 72 IN | DIASTOLIC BLOOD PRESSURE: 83 MMHG | RESPIRATION RATE: 16 BRPM | SYSTOLIC BLOOD PRESSURE: 132 MMHG | OXYGEN SATURATION: 99 % | HEART RATE: 97 BPM | WEIGHT: 173.28 LBS | BODY MASS INDEX: 23.47 KG/M2 | TEMPERATURE: 97.1 F

## 2024-01-02 VITALS
TEMPERATURE: 98 F | HEIGHT: 72 IN | HEART RATE: 98 BPM | BODY MASS INDEX: 23.47 KG/M2 | WEIGHT: 173.3 LBS | SYSTOLIC BLOOD PRESSURE: 127 MMHG | DIASTOLIC BLOOD PRESSURE: 75 MMHG | OXYGEN SATURATION: 99 %

## 2024-01-02 DIAGNOSIS — U07.1 INFECTION DUE TO 2019 NOVEL CORONAVIRUS: ICD-10-CM

## 2024-01-02 DIAGNOSIS — R07.89 TIGHTNESS IN CHEST: Primary | ICD-10-CM

## 2024-01-02 DIAGNOSIS — J18.9 PNEUMONIA OF LEFT LOWER LOBE DUE TO INFECTIOUS ORGANISM: ICD-10-CM

## 2024-01-02 DIAGNOSIS — R07.89 TIGHTNESS IN CHEST: ICD-10-CM

## 2024-01-02 DIAGNOSIS — R07.9 CHEST PAIN, UNSPECIFIED TYPE: Primary | ICD-10-CM

## 2024-01-02 LAB
BASOPHILS # BLD AUTO: 0 10E3/UL (ref 0–0.2)
BASOPHILS NFR BLD AUTO: 0 %
D DIMER PPP FEU-MCNC: 0.54 UG/ML FEU (ref 0–0.5)
EOSINOPHIL # BLD AUTO: 0 10E3/UL (ref 0–0.7)
EOSINOPHIL NFR BLD AUTO: 0 %
ERYTHROCYTE [DISTWIDTH] IN BLOOD BY AUTOMATED COUNT: 11.8 % (ref 10–15)
HCT VFR BLD AUTO: 37.4 % (ref 35–47)
HGB BLD-MCNC: 13.3 G/DL (ref 11.7–15.7)
IMM GRANULOCYTES # BLD: 0 10E3/UL
IMM GRANULOCYTES NFR BLD: 0 %
LYMPHOCYTES # BLD AUTO: 1.2 10E3/UL (ref 0.8–5.3)
LYMPHOCYTES NFR BLD AUTO: 15 %
MCH RBC QN AUTO: 31.7 PG (ref 26.5–33)
MCHC RBC AUTO-ENTMCNC: 35.6 G/DL (ref 31.5–36.5)
MCV RBC AUTO: 89 FL (ref 78–100)
MONOCYTES # BLD AUTO: 0.6 10E3/UL (ref 0–1.3)
MONOCYTES NFR BLD AUTO: 7 %
NEUTROPHILS # BLD AUTO: 6.2 10E3/UL (ref 1.6–8.3)
NEUTROPHILS NFR BLD AUTO: 77 %
PLATELET # BLD AUTO: 236 10E3/UL (ref 150–450)
RBC # BLD AUTO: 4.2 10E6/UL (ref 3.8–5.2)
WBC # BLD AUTO: 8 10E3/UL (ref 4–11)

## 2024-01-02 PROCEDURE — 36415 COLL VENOUS BLD VENIPUNCTURE: CPT | Performed by: NURSE PRACTITIONER

## 2024-01-02 PROCEDURE — 71275 CT ANGIOGRAPHY CHEST: CPT

## 2024-01-02 PROCEDURE — 99213 OFFICE O/P EST LOW 20 MIN: CPT | Mod: GC

## 2024-01-02 PROCEDURE — 99215 OFFICE O/P EST HI 40 MIN: CPT | Performed by: NURSE PRACTITIONER

## 2024-01-02 PROCEDURE — 250N000011 HC RX IP 250 OP 636: Performed by: NURSE PRACTITIONER

## 2024-01-02 PROCEDURE — 85025 COMPLETE CBC W/AUTO DIFF WBC: CPT | Performed by: NURSE PRACTITIONER

## 2024-01-02 PROCEDURE — 99417 PROLNG OP E/M EACH 15 MIN: CPT | Performed by: NURSE PRACTITIONER

## 2024-01-02 PROCEDURE — 85379 FIBRIN DEGRADATION QUANT: CPT | Performed by: NURSE PRACTITIONER

## 2024-01-02 PROCEDURE — 250N000009 HC RX 250: Performed by: NURSE PRACTITIONER

## 2024-01-02 PROCEDURE — 93000 ELECTROCARDIOGRAM COMPLETE: CPT | Mod: GC

## 2024-01-02 RX ORDER — LEVOFLOXACIN 500 MG/1
500 TABLET, FILM COATED ORAL DAILY
Qty: 7 TABLET | Refills: 0 | Status: SHIPPED | OUTPATIENT
Start: 2024-01-02 | End: 2024-01-09

## 2024-01-02 RX ORDER — IOPAMIDOL 755 MG/ML
80 INJECTION, SOLUTION INTRAVASCULAR ONCE
Status: COMPLETED | OUTPATIENT
Start: 2024-01-02 | End: 2024-01-02

## 2024-01-02 RX ADMIN — IOPAMIDOL 80 ML: 755 INJECTION, SOLUTION INTRAVENOUS at 12:26

## 2024-01-02 RX ADMIN — SODIUM CHLORIDE 100 ML: 9 INJECTION, SOLUTION INTRAVENOUS at 12:26

## 2024-01-02 NOTE — PROGRESS NOTES
Assessment & Plan     Tightness in chest  - CT Chest Pulmonary Embolism w Contrast; Future  - CBC with platelets differential; Future  - D dimer quantitative; Future  - sodium chloride (PF) 0.9% PF flush 3 mL  - CBC with platelets differential  - D dimer quantitative    Infection due to 2019 novel coronavirus  - CT Chest Pulmonary Embolism w Contrast; Future  - CBC with platelets differential; Future  - D dimer quantitative; Future  - sodium chloride (PF) 0.9% PF flush 3 mL  - CBC with platelets differential  - D dimer quantitative    Pneumonia of left lower lobe due to infectious organism  - levofloxacin (LEVAQUIN) 500 MG tablet; Take 1 tablet (500 mg) by mouth daily for 7 days    Patient Instructions     Results for orders placed or performed in visit on 01/02/24   CT Chest Pulmonary Embolism w Contrast     Status: None    Narrative    EXAM: CT CHEST PULMONARY EMBOLISM W CONTRAST  LOCATION: [facility]  DATE: 1/2/2024    INDICATION:  Tightness in chest, Infection due to 2019 novel coronavirus  COMPARISON: None.  TECHNIQUE: CT chest pulmonary angiogram during arterial phase injection of IV contrast. Multiplanar reformats and MIP reconstructions were performed. Dose reduction techniques were used.   CONTRAST: 80ml isovue 370    FINDINGS:  ANGIOGRAM CHEST: Pulmonary arteries are normal caliber and negative for pulmonary emboli. There is a small amount of motion artifact such as in the left lower lobe segmental - subsegmental pulmonary arteries which minimally limits evaluation. Thoracic   aorta is negative for dissection. No CT evidence of right heart strain.    LUNGS AND PLEURA: There is a small left lower lobe consolidation with mild left lower lobe bronchial wall thickening and mucous plugging. No pleural abnormality.    MEDIASTINUM/AXILLAE: A single mildly enlarged left infrahilar lymph node is favored reactive. Normal thyroid gland and esophagus.    CORONARY ARTERY CALCIFICATION:  None.    UPPER ABDOMEN: Simple hepatic cysts need no follow-up. Tiny gastric fundal diverticulum.    MUSCULOSKELETAL: Normal.      Impression    IMPRESSION:  1.  No evidence of acute pulmonary embolism or right heart strain.  2.  Left lower lobe pneumonia.   Results for orders placed or performed in visit on 01/02/24   D dimer quantitative     Status: Abnormal   Result Value Ref Range    D-Dimer Quantitative 0.54 (H) 0.00 - 0.50 ug/mL FEU    Narrative    This D-dimer assay is intended for use in conjunction with a clinical pretest probability assessment model to exclude pulmonary embolism (PE) and deep venous thrombosis (DVT) in outpatients suspected of PE or DVT. The cut-off value is 0.50 ug/mL FEU.   CBC with platelets and differential     Status: None   Result Value Ref Range    WBC Count 8.0 4.0 - 11.0 10e3/uL    RBC Count 4.20 3.80 - 5.20 10e6/uL    Hemoglobin 13.3 11.7 - 15.7 g/dL    Hematocrit 37.4 35.0 - 47.0 %    MCV 89 78 - 100 fL    MCH 31.7 26.5 - 33.0 pg    MCHC 35.6 31.5 - 36.5 g/dL    RDW 11.8 10.0 - 15.0 %    Platelet Count 236 150 - 450 10e3/uL    % Neutrophils 77 %    % Lymphocytes 15 %    % Monocytes 7 %    % Eosinophils 0 %    % Basophils 0 %    % Immature Granulocytes 0 %    Absolute Neutrophils 6.2 1.6 - 8.3 10e3/uL    Absolute Lymphocytes 1.2 0.8 - 5.3 10e3/uL    Absolute Monocytes 0.6 0.0 - 1.3 10e3/uL    Absolute Eosinophils 0.0 0.0 - 0.7 10e3/uL    Absolute Basophils 0.0 0.0 - 0.2 10e3/uL    Absolute Immature Granulocytes 0.0 <=0.4 10e3/uL   CBC with platelets differential     Status: None    Narrative    The following orders were created for panel order CBC with platelets differential.  Procedure                               Abnormality         Status                     ---------                               -----------         ------                     CBC with platelets and d...[[id number]]                      Final result                 Please view results for these tests on the  individual orders.     LLL pneumonia  noted on CT.   Start levaquin daily x 1 week.    Push fluids  Lots of handwashing.   Ibuprofen as needed for fever or pain  Delsym or dayquil/nyquil for cough as needed     Rest as able.   F/u in the clinic if symptoms persist or worsen.    65 min with pt and more than 50% of the time was spent in counseling and coordination of care of the above issues         Return in about 1 week (around 1/9/2024) for with regular provider if symptoms persist.    WANDER Block CNP  [facility] JADYN Love is a 38 year old, presenting for the following health issues:  Chest Pain      HPI     Chest Pain  Onset/Duration: 1 month, worsening over the last week  Description:   Location: left side last night, usually right side, general tightness across the chest  Character: sharp  Radiation: on left side  Duration: 20 minutes   Intensity: moderate  Progression of Symptoms: worsening  Accompanying Signs & Symptoms:  Shortness of breath: hard to get a full deep breath  Sweating: No  Nausea/vomiting: No  Lightheadedness: YES- over the last couple of weeks with positional shifts  Palpitations: No  Fever/Chills: YES- low grade fever and chills  Cough: YES- was originally productive but now mostly dry           Heartburn: No  History:   Family history of heart disease: No, Dad has Afib  Tobacco use: No  Previous similar symptoms: no   Precipitating factors:   Worse with exertion: No  Worse with deep breaths: YES           Related to eating: No           Better with burping: No  Alleviating factors: None  Therapies tried and outcome: acetaminophen and staying hydrated        Review of Systems   Constitutional, HEENT, cardiovascular, pulmonary, GI, , musculoskeletal, neuro, skin, endocrine and psych systems are negative, except as otherwise noted.      Objective    /83 (BP Location: Right arm, Patient Position: Sitting, Cuff Size: Adult Regular)    "Pulse 97   Temp 97.1  F (36.2  C)   Resp 16   Ht 1.88 m (6' 2\")   Wt 78.6 kg (173 lb 4.5 oz)   LMP 12/15/2023 (Approximate)   SpO2 99%   BMI 22.25 kg/m    Body mass index is 22.25 kg/m .  Physical Exam   GENERAL: healthy, alert and no distress  EYES: Eyes grossly normal to inspection, PERRL and conjunctivae and sclerae normal  HENT: ear canals and TM's normal, nose and mouth without ulcers or lesions  NECK: no adenopathy, no asymmetry, masses, or scars and thyroid normal to palpation  RESP: lungs clear to auscultation - no rales, rhonchi or wheezes and decreased breath sounds bibasilar  CV: regular rate and rhythm, normal S1 S2, no S3 or S4, no murmur, click or rub, no peripheral edema and peripheral pulses strong  ABDOMEN: soft, nontender, no hepatosplenomegaly, no masses and bowel sounds normal  MS: no gross musculoskeletal defects noted, no edema                  "

## 2024-01-02 NOTE — PATIENT INSTRUCTIONS
ACUTE DIAGNOSTIC SERVICES:  [place] [address] [facility], [address], [place] [address]

## 2024-01-02 NOTE — PATIENT INSTRUCTIONS
Results for orders placed or performed in visit on 01/02/24   CT Chest Pulmonary Embolism w Contrast     Status: None    Narrative    EXAM: CT CHEST PULMONARY EMBOLISM W CONTRAST  LOCATION: [facility]  DATE: 1/2/2024    INDICATION:  Tightness in chest, Infection due to 2019 novel coronavirus  COMPARISON: None.  TECHNIQUE: CT chest pulmonary angiogram during arterial phase injection of IV contrast. Multiplanar reformats and MIP reconstructions were performed. Dose reduction techniques were used.   CONTRAST: 80ml isovue 370    FINDINGS:  ANGIOGRAM CHEST: Pulmonary arteries are normal caliber and negative for pulmonary emboli. There is a small amount of motion artifact such as in the left lower lobe segmental - subsegmental pulmonary arteries which minimally limits evaluation. Thoracic   aorta is negative for dissection. No CT evidence of right heart strain.    LUNGS AND PLEURA: There is a small left lower lobe consolidation with mild left lower lobe bronchial wall thickening and mucous plugging. No pleural abnormality.    MEDIASTINUM/AXILLAE: A single mildly enlarged left infrahilar lymph node is favored reactive. Normal thyroid gland and esophagus.    CORONARY ARTERY CALCIFICATION: None.    UPPER ABDOMEN: Simple hepatic cysts need no follow-up. Tiny gastric fundal diverticulum.    MUSCULOSKELETAL: Normal.      Impression    IMPRESSION:  1.  No evidence of acute pulmonary embolism or right heart strain.  2.  Left lower lobe pneumonia.   Results for orders placed or performed in visit on 01/02/24   D dimer quantitative     Status: Abnormal   Result Value Ref Range    D-Dimer Quantitative 0.54 (H) 0.00 - 0.50 ug/mL FEU    Narrative    This D-dimer assay is intended for use in conjunction with a clinical pretest probability assessment model to exclude pulmonary embolism (PE) and deep venous thrombosis (DVT) in outpatients suspected of PE or DVT. The cut-off value is 0.50 ug/mL FEU.   CBC with  platelets and differential     Status: None   Result Value Ref Range    WBC Count 8.0 4.0 - 11.0 10e3/uL    RBC Count 4.20 3.80 - 5.20 10e6/uL    Hemoglobin 13.3 11.7 - 15.7 g/dL    Hematocrit 37.4 35.0 - 47.0 %    MCV 89 78 - 100 fL    MCH 31.7 26.5 - 33.0 pg    MCHC 35.6 31.5 - 36.5 g/dL    RDW 11.8 10.0 - 15.0 %    Platelet Count 236 150 - 450 10e3/uL    % Neutrophils 77 %    % Lymphocytes 15 %    % Monocytes 7 %    % Eosinophils 0 %    % Basophils 0 %    % Immature Granulocytes 0 %    Absolute Neutrophils 6.2 1.6 - 8.3 10e3/uL    Absolute Lymphocytes 1.2 0.8 - 5.3 10e3/uL    Absolute Monocytes 0.6 0.0 - 1.3 10e3/uL    Absolute Eosinophils 0.0 0.0 - 0.7 10e3/uL    Absolute Basophils 0.0 0.0 - 0.2 10e3/uL    Absolute Immature Granulocytes 0.0 <=0.4 10e3/uL   CBC with platelets differential     Status: None    Narrative    The following orders were created for panel order CBC with platelets differential.  Procedure                               Abnormality         Status                     ---------                               -----------         ------                     CBC with platelets and d...[[id number]]                      Final result                 Please view results for these tests on the individual orders.     LLL pneumonia  noted on CT.   Start levaquin daily x 1 week.    Push fluids  Lots of handwashing.   Ibuprofen as needed for fever or pain  Delsym or dayquil/nyquil for cough as needed     Rest as able.   F/u in the clinic if symptoms persist or worsen.

## 2024-01-02 NOTE — PROGRESS NOTES
Assessment & Plan     Chest pain, unspecified type  Presenting with recent COVID infection in early December, mild recovery for a couple weeks, then sudden worsening over the last week or so.  Cough, chest tightness, dyspnea, fatigue, and a left-sided chest pain that woke her from deep sleep concerning for possible PE in the context of recent COVID infection.  Lungs were clear to me on exam, so less concern for possible pneumonia, however with the above symptoms I encouraged her to present to the acute diagnostic center for rule out of pulmonary embolism.  Will have our nursing team reach out to her later this afternoon to check in and make sure that she has what she needs.  - EKG 12-lead complete w/read - Clinics  - Referral to ADS    Return if symptoms worsen or fail to improve; will have RN call later today for follow-up.    Gamal Sunshine DO  [facility]      Referral to Acute and Diagnostic Services    [phone] ([place]) [address], [address], [place] [address]    Transition to Acute & Diagnostic Services Clinic has been discussed with patient, and she agrees with next level of care.   Patient understands that evaluation/treatment at [facility] typically takes significantly longer than in clinic/urgent care (>2 hours).  The [facility] Acute and Diagnostics Services Clinic has been contacted by provider/staff to confirm patient acceptance.           Special issues:      None               The following provider has assessed this patient for intervention at [facility], and directed the patient for referral: Gamal Sunshine DO                Aylin Love is a 38 year old, presenting for the following health issues:  Other (Pt presents with long covid. Pt reports a positive covid test last month, but is c/o lingering symptoms. She is c/o a cough w/ chest tightness and wheezing. Denies hx of asthma )        1/2/2024     8:48 AM   Additional Questions  "  Roomed by [place]       HPI     Post-viral syndrome - Exposed to COVID daniella, one of the last days of Nov/first of Dec - tested positive early dec. Symptoms were at their worst early December.  was positive, kiddos were positive & did get PNA. Fully vaccinated. Kids are doing okay,  has lingering cough. For Ivan, symptoms almost nearly resolved after about a week/week and a half of symptoms, but never got back to normal. Felt okay over suzanne but has gotten worse again.    Lingering chest tightness, feels like deep breaths have not been quite as effective as usual. Woke up with some L sided parasternal pain. Has felt warm, hasn't checked at home. Has had chills. Some body aches. Lingering cough, but not as productive as it was (more dry). Gets a little drier throughout the day. Fatigue is bad, can't exercise, low energy. Hasn't rechecked COVID test at home.    Has a scattered hx of migraines (5x lifetime) but did get one during COVID.      Review of Systems   Constitutional, HEENT, cardiovascular, pulmonary, gi and gu systems are negative, except as otherwise noted.      Objective    /75   Pulse 98   Temp 98  F (36.7  C) (Oral)   Ht 1.88 m (6' 2\")   Wt 78.6 kg (173 lb 4.8 oz)   LMP 12/15/2023 (Approximate)   SpO2 99%   Breastfeeding No   BMI 22.25 kg/m    Body mass index is 22.25 kg/m .  Physical Exam   Vitals reviewed.  Constitutional: awake, alert, cooperative, in NAD  Eyes: Sclera without jaundice or injection, PERRLA, EOM intact   HENT: NCAT without lesions, oral cavity with MMM, intact dentition  Respiratory: Lungs CTAB without crackles, wheezing, rales, or increased work of breathing  Cardiovascular: Borderline tachycardic without murmurs or extra sounds, radial pulses 2+ bilaterally  Abdomen: Soft, non-distended, non-tender, positive bowel sounds  Skin: Warm & dry, without lesions, erythema, rashes, or ecchymosis  Musculoskeletal: No joint/extremity redness, warmth, " swelling in bilateral lower extremities. Chest pain not reproducible on palpation.  Neurologic: A&Ox4, without focal deficit, cranial nerves II-XII grossly intact  Psychiatric: Alert & calm with appropriate affect, mood, insight, and thought processes

## 2024-01-10 ENCOUNTER — HOSPITAL ENCOUNTER (OUTPATIENT)
Dept: MRI IMAGING | Facility: CLINIC | Age: 39
Discharge: HOME | End: 2024-01-10
Attending: FAMILY MEDICINE | Admitting: FAMILY MEDICINE
Payer: COMMERCIAL

## 2024-01-10 DIAGNOSIS — M79.662 PAIN IN LEFT SHIN: ICD-10-CM

## 2024-01-10 PROCEDURE — 73718 MRI LOWER EXTREMITY W/O DYE: CPT | Mod: LT

## 2024-01-10 PROCEDURE — 73718 MRI LOWER EXTREMITY W/O DYE: CPT | Mod: 26 | Performed by: RADIOLOGY

## 2024-01-15 ENCOUNTER — VIRTUAL VISIT (OUTPATIENT)
Dept: ORTHOPEDICS | Facility: CLINIC | Age: 39
End: 2024-01-15
Payer: COMMERCIAL

## 2024-01-15 DIAGNOSIS — M79.662 PAIN OF LEFT LOWER LEG: Primary | ICD-10-CM

## 2024-01-15 PROCEDURE — 99442 PR PHYSICIAN TELEPHONE EVALUATION 11-20 MIN: CPT | Mod: 93 | Performed by: FAMILY MEDICINE

## 2024-01-15 NOTE — LETTER
1/15/2024       RE: Ivan Gilman  [address]  [facility] [address]     Dear Colleague,    Thank you for referring your patient, Ivan Gilman, to the [facility] [place] at [facility]. Please see a copy of my visit note below.      [facility]  SPORTS & ORTHOPEDIC CLINIC VISIT     Aris 15, 2024        ASSESSMENT & PLAN    38-year-old with localized left anterior shin pain particular with running that is been unimproved with period of rest and activity modification.  No sign of bone stress injury on recent radiographs.  Suspect could be tibial stress syndrome versus atypical compartment syndrome.    Reviewed imaging and assessment with patient in detail  We discussed treatment with formal physical therapy as well as gait assessment.  Okay to continue activity as tolerated as no structural lesion is seen  We briefly discussed the additional symptoms of potential compartment syndrome and she will follow-up or contact us if she experiences more of the symptoms.  We also discussed footwear modifications.    Andrea Salgado MD  [facility] SP[facility]    -----  Chief Complaint   Patient presents with    Follow Up     MRI results          Ivan is a 38 year old who is being evaluated via a billable telephone visit.      What phone number would you like to be contacted at? [phone]  How would you like to obtain your AVS? MyChart    Distant Location (provider location):  On-site  Phone call duration: 11 minutes         SUBJECTIVE  Ivan Gilman is a/an 38 year old female who is seen for follow up of left shin pain.       Date of injury: August 2023  Date of Last Visit: 12/27/23   Symptoms: no change  Worsened by: Working out, jumping, running, biking   Better with: Rest   Treatment to date: rest/activity avoidance, ice, and ibuprofen  Associated symptoms: Pain over anterior  shin        REVIEW OF SYSTEMS:  See HPI     OBJECTIVE:  LMP 12/15/2023 (Approximate)      No exam. Telephone visit    RADIOLOGY:    MRI of the left lower leg performed on 1/10/2024.  Per radiology report:  Impression:     No evidence of left leg stress reaction.           Again, thank you for allowing me to participate in the care of your patient.      Sincerely,    Andrea Salgado MD

## 2024-01-15 NOTE — PROGRESS NOTES
[facility] SUR[facility]ERY [facility]  SPORTS & ORTHOPEDIC CLINIC VISIT     Aris 15, 2024        ASSESSMENT & PLAN    38-year-old with localized left anterior shin pain particular with running that is been unimproved with period of rest and activity modification.  No sign of bone stress injury on recent radiographs.  Suspect could be tibial stress syndrome versus atypical compartment syndrome.    Reviewed imaging and assessment with patient in detail  We discussed treatment with formal physical therapy as well as gait assessment.  Okay to continue activity as tolerated as no structural lesion is seen  We briefly discussed the additional symptoms of potential compartment syndrome and she will follow-up or contact us if she experiences more of the symptoms.  We also discussed footwear modifications.    Andrea Salgado MD  [facility] SP[facility]TS [facility]IC[facility] [place]    -----  Chief Complaint   Patient presents with    Follow Up     MRI results          Ivan is a 38 year old who is being evaluated via a billable telephone visit.      What phone number would you like to be contacted at? [phone]  How would you like to obtain your AVS? MyChart    Distant Location (provider location):  On-site  Phone call duration: 11 minutes         SUBJECTIVE  Ivan Gilman is a/an 38 year old female who is seen for follow up of left shin pain.       Date of injury: August 2023  Date of Last Visit: 12/27/23   Symptoms: no change  Worsened by: Working out, jumping, running, biking   Better with: Rest   Treatment to date: rest/activity avoidance, ice, and ibuprofen  Associated symptoms: Pain over anterior shin        REVIEW OF SYSTEMS:  See HPI     OBJECTIVE:  LMP 12/15/2023 (Approximate)      No exam. Telephone visit    RADIOLOGY:    MRI of the left lower leg performed on 1/10/2024.  Per radiology report:  Impression:     No evidence of left leg stress reaction.

## 2024-01-15 NOTE — PATIENT INSTRUCTIONS
To schedule a Physical Therapy appointment with the [facility], please call [phone].

## 2024-01-20 ENCOUNTER — HOSPITAL ENCOUNTER (EMERGENCY)
Facility: CLINIC | Age: 39
Discharge: HOME | End: 2024-01-20
Attending: EMERGENCY MEDICINE | Admitting: EMERGENCY MEDICINE
Payer: COMMERCIAL

## 2024-01-20 ENCOUNTER — APPOINTMENT (OUTPATIENT)
Dept: ULTRASOUND IMAGING | Facility: CLINIC | Age: 39
End: 2024-01-20
Attending: EMERGENCY MEDICINE
Payer: COMMERCIAL

## 2024-01-20 ENCOUNTER — APPOINTMENT (OUTPATIENT)
Dept: CT IMAGING | Facility: CLINIC | Age: 39
End: 2024-01-20
Attending: EMERGENCY MEDICINE
Payer: COMMERCIAL

## 2024-01-20 VITALS
HEART RATE: 64 BPM | DIASTOLIC BLOOD PRESSURE: 98 MMHG | OXYGEN SATURATION: 95 % | BODY MASS INDEX: 22.35 KG/M2 | RESPIRATION RATE: 16 BRPM | HEIGHT: 72 IN | SYSTOLIC BLOOD PRESSURE: 145 MMHG | TEMPERATURE: 97.9 F | WEIGHT: 165 LBS

## 2024-01-20 DIAGNOSIS — R10.13 EPIGASTRIC PAIN: ICD-10-CM

## 2024-01-20 DIAGNOSIS — R11.2 NAUSEA VOMITING AND DIARRHEA: ICD-10-CM

## 2024-01-20 DIAGNOSIS — R19.7 NAUSEA VOMITING AND DIARRHEA: ICD-10-CM

## 2024-01-20 LAB
ALBUMIN SERPL BCG-MCNC: 4.9 G/DL (ref 3.5–5.2)
ALP SERPL-CCNC: 54 U/L (ref 40–150)
ALT SERPL W P-5'-P-CCNC: 13 U/L (ref 0–50)
ANION GAP SERPL CALCULATED.3IONS-SCNC: 10 MMOL/L (ref 7–15)
AST SERPL W P-5'-P-CCNC: 17 U/L (ref 0–45)
BASOPHILS # BLD AUTO: 0 10E3/UL (ref 0–0.2)
BASOPHILS NFR BLD AUTO: 0 %
BILIRUB SERPL-MCNC: 0.9 MG/DL
BUN SERPL-MCNC: 9.5 MG/DL (ref 6–20)
CALCIUM SERPL-MCNC: 9.9 MG/DL (ref 8.6–10)
CHLORIDE SERPL-SCNC: 99 MMOL/L (ref 98–107)
CREAT SERPL-MCNC: 0.72 MG/DL (ref 0.51–0.95)
DEPRECATED HCO3 PLAS-SCNC: 27 MMOL/L (ref 22–29)
EGFRCR SERPLBLD CKD-EPI 2021: >90 ML/MIN/1.73M2
EOSINOPHIL # BLD AUTO: 0 10E3/UL (ref 0–0.7)
EOSINOPHIL NFR BLD AUTO: 1 %
ERYTHROCYTE [DISTWIDTH] IN BLOOD BY AUTOMATED COUNT: 12 % (ref 10–15)
GLUCOSE SERPL-MCNC: 132 MG/DL (ref 70–99)
HCG SERPL QL: NEGATIVE
HCT VFR BLD AUTO: 36.8 % (ref 35–47)
HGB BLD-MCNC: 13.6 G/DL (ref 11.7–15.7)
IMM GRANULOCYTES # BLD: 0 10E3/UL
IMM GRANULOCYTES NFR BLD: 0 %
LIPASE SERPL-CCNC: 22 U/L (ref 13–60)
LYMPHOCYTES # BLD AUTO: 1.2 10E3/UL (ref 0.8–5.3)
LYMPHOCYTES NFR BLD AUTO: 24 %
MCH RBC QN AUTO: 32 PG (ref 26.5–33)
MCHC RBC AUTO-ENTMCNC: 37 G/DL (ref 31.5–36.5)
MCV RBC AUTO: 87 FL (ref 78–100)
MONOCYTES # BLD AUTO: 0.5 10E3/UL (ref 0–1.3)
MONOCYTES NFR BLD AUTO: 9 %
NEUTROPHILS # BLD AUTO: 3.3 10E3/UL (ref 1.6–8.3)
NEUTROPHILS NFR BLD AUTO: 66 %
NRBC # BLD AUTO: 0 10E3/UL
NRBC BLD AUTO-RTO: 0 /100
PLATELET # BLD AUTO: 250 10E3/UL (ref 150–450)
POTASSIUM SERPL-SCNC: 3.5 MMOL/L (ref 3.4–5.3)
PROT SERPL-MCNC: 7.6 G/DL (ref 6.4–8.3)
RBC # BLD AUTO: 4.25 10E6/UL (ref 3.8–5.2)
SODIUM SERPL-SCNC: 136 MMOL/L (ref 135–145)
WBC # BLD AUTO: 5 10E3/UL (ref 4–11)

## 2024-01-20 PROCEDURE — 36415 COLL VENOUS BLD VENIPUNCTURE: CPT | Performed by: EMERGENCY MEDICINE

## 2024-01-20 PROCEDURE — 76705 ECHO EXAM OF ABDOMEN: CPT

## 2024-01-20 PROCEDURE — 250N000011 HC RX IP 250 OP 636: Performed by: EMERGENCY MEDICINE

## 2024-01-20 PROCEDURE — 96374 THER/PROPH/DIAG INJ IV PUSH: CPT | Mod: 59

## 2024-01-20 PROCEDURE — 250N000009 HC RX 250: Performed by: EMERGENCY MEDICINE

## 2024-01-20 PROCEDURE — 82040 ASSAY OF SERUM ALBUMIN: CPT | Performed by: EMERGENCY MEDICINE

## 2024-01-20 PROCEDURE — 96361 HYDRATE IV INFUSION ADD-ON: CPT

## 2024-01-20 PROCEDURE — 83690 ASSAY OF LIPASE: CPT | Performed by: EMERGENCY MEDICINE

## 2024-01-20 PROCEDURE — 74177 CT ABD & PELVIS W/CONTRAST: CPT

## 2024-01-20 PROCEDURE — 258N000003 HC RX IP 258 OP 636: Performed by: EMERGENCY MEDICINE

## 2024-01-20 PROCEDURE — 85025 COMPLETE CBC W/AUTO DIFF WBC: CPT | Performed by: EMERGENCY MEDICINE

## 2024-01-20 PROCEDURE — 99285 EMERGENCY DEPT VISIT HI MDM: CPT | Mod: 25

## 2024-01-20 PROCEDURE — 96375 TX/PRO/DX INJ NEW DRUG ADDON: CPT

## 2024-01-20 PROCEDURE — 84703 CHORIONIC GONADOTROPIN ASSAY: CPT | Performed by: EMERGENCY MEDICINE

## 2024-01-20 RX ORDER — OMEPRAZOLE 40 MG/1
40 CAPSULE, DELAYED RELEASE ORAL DAILY
Qty: 14 CAPSULE | Refills: 0 | Status: SHIPPED | OUTPATIENT
Start: 2024-01-20

## 2024-01-20 RX ORDER — ONDANSETRON 2 MG/ML
4 INJECTION INTRAMUSCULAR; INTRAVENOUS EVERY 30 MIN PRN
Status: DISCONTINUED | OUTPATIENT
Start: 2024-01-20 | End: 2024-01-20 | Stop reason: HOSPADM

## 2024-01-20 RX ORDER — ONDANSETRON 4 MG/1
4-8 TABLET, ORALLY DISINTEGRATING ORAL EVERY 8 HOURS PRN
Qty: 12 TABLET | Refills: 0 | Status: SHIPPED | OUTPATIENT
Start: 2024-01-20 | End: 2024-01-20

## 2024-01-20 RX ORDER — IOPAMIDOL 755 MG/ML
83 INJECTION, SOLUTION INTRAVASCULAR ONCE
Status: COMPLETED | OUTPATIENT
Start: 2024-01-20 | End: 2024-01-20

## 2024-01-20 RX ORDER — MORPHINE SULFATE 4 MG/ML
4 INJECTION, SOLUTION INTRAMUSCULAR; INTRAVENOUS
Status: DISCONTINUED | OUTPATIENT
Start: 2024-01-20 | End: 2024-01-20 | Stop reason: HOSPADM

## 2024-01-20 RX ORDER — OXYCODONE HYDROCHLORIDE 5 MG/1
5 TABLET ORAL EVERY 6 HOURS PRN
Qty: 6 TABLET | Refills: 0 | Status: SHIPPED | OUTPATIENT
Start: 2024-01-20 | End: 2024-01-22

## 2024-01-20 RX ORDER — SUCRALFATE 1 G/1
1 TABLET ORAL 4 TIMES DAILY
Qty: 60 TABLET | Refills: 0 | Status: SHIPPED | OUTPATIENT
Start: 2024-01-20

## 2024-01-20 RX ORDER — ONDANSETRON 4 MG/1
4-8 TABLET, ORALLY DISINTEGRATING ORAL EVERY 8 HOURS PRN
Qty: 12 TABLET | Refills: 0 | Status: SHIPPED | OUTPATIENT
Start: 2024-01-20

## 2024-01-20 RX ADMIN — SODIUM CHLORIDE 64 ML: 9 INJECTION, SOLUTION INTRAVENOUS at 06:22

## 2024-01-20 RX ADMIN — IOPAMIDOL 83 ML: 755 INJECTION, SOLUTION INTRAVENOUS at 06:22

## 2024-01-20 RX ADMIN — ONDANSETRON 4 MG: 2 INJECTION INTRAMUSCULAR; INTRAVENOUS at 04:04

## 2024-01-20 RX ADMIN — SODIUM CHLORIDE 1000 ML: 9 INJECTION, SOLUTION INTRAVENOUS at 03:57

## 2024-01-20 RX ADMIN — MORPHINE SULFATE 4 MG: 4 INJECTION, SOLUTION INTRAMUSCULAR; INTRAVENOUS at 05:28

## 2024-01-20 ASSESSMENT — ACTIVITIES OF DAILY LIVING (ADL)
ADLS_ACUITY_SCORE: 35
ADLS_ACUITY_SCORE: 35

## 2024-01-20 NOTE — DISCHARGE INSTRUCTIONS
*Get plenty of rest and avoid strenuous activities.  *Take medications as prescribed.  Tylenol for pain.  Oxycodone for severe pain not relieved by tylenol.  Omeprazole and carafate as directed. Zofran for nausea.   *Follow-up with your doctor for a recheck within 12-36 hours.  *Return to the ER if you develop fever, worsening pain, pain that moves to the right lower abdomen, faint or feel like you will faint or become worse in any way.    Discharge Instructions  Abdominal Pain    Abdominal pain (belly pain) can be caused by many things. Your evaluation today does not show the exact cause for your pain. Your provider today has decided that it is unlikely your pain is due to a life threatening problem, or a problem requiring surgery or hospital admission. Sometimes those problems cannot be found right away, so it is very important that you follow up as directed.  Sometimes only the changes which occur over time allow the cause of your pain to be found.    Generally, every Emergency Department visit should have a follow-up clinic visit with either a primary or a specialty clinic/provider. Please follow-up as instructed by your emergency provider today. With abdominal pain, we often recommend very close follow-up, such as the following day.    ADULTS:  Return to the Emergency Department right away if:    You get an oral temperature above 102oF or as directed by your provider.  You have blood in your stools. This may be bright red or appear as black, tarry stools.    You keep vomiting (throwing up) or cannot drink liquids.  You see blood when you vomit.   You cannot have a bowel movement or you cannot pass gas.  Your stomach gets bloated or bigger.  Your skin or the whites of your eyes look yellow.  You faint.  You have bloody, frequent or painful urination (peeing).  You have new symptoms or anything that worries you.    CHILDREN:  Return to the Emergency Department right away if your child has any of the above-listed  symptoms or the following:    Pushes your hand away or screams/cries when his/her belly is touched.  You notice your child is very fussy or weak.  Your child is very tired and is too tired to eat or drink.  Your child is dehydrated.  Signs of dehydration can be:  Significant change in the amount of wet diapers/urine.  Your infant or child starts to have dry mouth and lips, or no saliva (spit) or tears.    PREGNANT WOMEN:  Return to the Emergency Department right away if you have any of the above-listed symptoms or the following:    You have bleeding, leaking fluid or passing tissue from the vagina.  You have worse pain or cramping, or pain in your shoulder or back.  You have vomiting that will not stop.  You have a temperature of 100oF or more.  Your baby is not moving as much as usual.  You faint.  You get a bad headache with or without eye problems and abdominal pain.  You have a seizure.  You have unusual discharge from your vagina and abdominal pain.    Abdominal pain is pretty common during pregnancy.  Your pain may or may not be related to your pregnancy. You should follow-up closely with your OB provider so they can evaluate you and your baby.  Until you follow-up with your regular provider, do the following:     Avoid sex and do not put anything in your vagina.  Drink clear fluids.  Only take medications approved by your provider.    MORE INFORMATION:    Appendicitis:  A possible cause of abdominal pain in any person who still has their appendix is acute appendicitis. Appendicitis is often hard to diagnose.  Testing does not always rule out early appendicitis or other causes of abdominal pain. Close follow-up with your provider and re-evaluations may be needed to figure out the reason for your abdominal pain.    Follow-up:  It is very important that you make an appointment with your clinic and go to the appointment.  If you do not follow-up with your primary provider, it may result in missing an important  "development which could result in permanent injury or disability and/or lasting pain.  If there is any problem keeping your appointment, call your provider or return to the Emergency Department.    Medications:  Take your medications as directed by your provider today.  Before using over-the-counter medications, ask your provider and make sure to take the medications as directed.  If you have any questions about medications, ask your provider.    Diet:  Resume your normal diet as much as possible, but do not eat fried, fatty or spicy foods while you have pain.  Do not drink alcohol or have caffeine.  Do not smoke tobacco.    Probiotics: If you have been given an antibiotic, you may want to also take a probiotic pill or eat yogurt with live cultures. Probiotics have \"good bacteria\" to help your intestines stay healthy. Studies have shown that probiotics help prevent diarrhea (loose stools) and other intestine problems (including C. diff infection) when you take antibiotics. You can buy these without a prescription in the pharmacy section of the store.     If you were given a prescription for medicine here today, be sure to read all of the information (including the package insert) that comes with your prescription.  This will include important information about the medicine, its side effects, and any warnings that you need to know about.  The pharmacist who fills the prescription can provide more information and answer questions you may have about the medicine.  If you have questions or concerns that the pharmacist cannot address, please call or return to the Emergency Department.       Remember that you can always come back to the Emergency Department if you are not able to see your regular provider in the amount of time listed above, if you get any new symptoms, or if there is anything that worries you.    "

## 2024-01-20 NOTE — ED PROVIDER NOTES
History     Chief Complaint:  Abdominal Pain       HPI   Ivan Gilman is a 38 year old female with a history of ovarian cysts and  who comes today with upper abdominal pain.  She reports that earlier in the week, 5 days prior, she developed what she thought was food poisoning.  She had a lot of diarrhea and she also had episodes of emesis.  She was not tolerating food for approximately 3 days.  On the previous day she started to take in food.  She had 2 small loose bowel movements.  Tonight she developed worsening epigastric pain that is causing her to arrive in pain and be in unbilled to sleep.  She took 2 Tums without relief.  She took 2 ibuprofen without relief.  The pain does radiate to her back and she tried to hot pack and she also tried Thera gun and this did not provide relief as well.  She has had ovarian cyst rupture but this does not feel like that because it is higher up.  She still has her gallbladder.  No history of a bowel obstruction or colitis.  Nobody else got sick in her house and so she doubts that this was food poisoning as she has other children at home.  She does not believe she has had a fever.    Independent Historian:    None - Patient Only    Review of External Notes:  Reviewed primary care visit note from earlier in January.  Patient seen for possible stress fracture from running    Allergies:  No Known Allergies     Medications:    omeprazole (PRILOSEC) 40 MG DR capsule  ondansetron (ZOFRAN ODT) 4 MG ODT tab  oxyCODONE (ROXICODONE) 5 MG tablet  sucralfate (CARAFATE) 1 GM tablet        Past Medical History:    Past Medical History:   Diagnosis Date    Cholestasis of pregnancy (H28) 2020    Rupture of ovarian cyst 2018       Past Surgical History:    Past Surgical History:   Procedure Laterality Date     SECTION N/A 2020    Procedure: PRIMARY  SECTION (TWINS);  Surgeon: Patrice Garcia MD;  Location:  L+D    CYSTECTOMY OVARIAN BENIGN N/A      LAPAROSCOPIC CYSTECTOMY OVARIAN (BENIGN) Left 12/2018    wisdom teeth N/A 2005        Physical Exam   No data found.       Physical Exam  General: Well-nourished,  appears to be in pain  Eyes: PERRL, conjunctivae pink no scleral icterus or conjunctival injection  ENT:  Moist mucus membranes, posterior oropharynx clear without erythema or exudates  Respiratory:  Lungs clear to auscultation bilaterally, no crackles/rubs/wheezes.  Good air movement  CV: Normal rate and rhythm, no murmurs/rubs/gallops  GI:  Abdomen soft and non-distended.  Normoactive BS.  +moderate RUQ and epigastric tenderness, no guarding or rebound  Skin: Warm, dry.  No rashes or petechiae  Musculoskeletal: No peripheral edema or calf tenderness  Neuro: Alert and oriented to person/place/time  Psychiatric: Normal affect      Emergency Department Course     Laboratory: Imaging:   Labs Ordered and Resulted from Time of ED Arrival to Time of ED Departure   COMPREHENSIVE METABOLIC PANEL - Abnormal       Result Value    Sodium 136      Potassium 3.5      Carbon Dioxide (CO2) 27      Anion Gap 10      Urea Nitrogen 9.5      Creatinine 0.72      GFR Estimate >90      Calcium 9.9      Chloride 99      Glucose 132 (*)     Alkaline Phosphatase 54      AST 17      ALT 13      Protein Total 7.6      Albumin 4.9      Bilirubin Total 0.9     CBC WITH PLATELETS AND DIFFERENTIAL - Abnormal    WBC Count 5.0      RBC Count 4.25      Hemoglobin 13.6      Hematocrit 36.8      MCV 87      MCH 32.0      MCHC 37.0 (*)     RDW 12.0      Platelet Count 250      % Neutrophils 66      % Lymphocytes 24      % Monocytes 9      % Eosinophils 1      % Basophils 0      % Immature Granulocytes 0      NRBCs per 100 WBC 0      Absolute Neutrophils 3.3      Absolute Lymphocytes 1.2      Absolute Monocytes 0.5      Absolute Eosinophils 0.0      Absolute Basophils 0.0      Absolute Immature Granulocytes 0.0      Absolute NRBCs 0.0     LIPASE - Normal    Lipase 22     HCG QUALITATIVE  PREGNANCY - Normal    hCG Serum Qualitative Negative       CT Abdomen Pelvis w Contrast   Final Result   IMPRESSION:    1.  Equivocal changes of a very mild distal colitis. Otherwise, no focal inflammatory process.      US Abdomen Limited (RUQ)   Final Result   IMPRESSION:   1.  Borderline distended gallbladder with small amount of sludge.                    Emergency Department Course & Assessments:       Interventions:  Medications   sodium chloride 0.9% BOLUS 1,000 mL (0 mLs Intravenous Stopped 1/20/24 0702)   iopamidol (ISOVUE-370) solution 83 mL (83 mLs Intravenous $Given 1/20/24 0622)   Saline Flush (64 mLs Intravenous $Given 1/20/24 0622)        Independent Interpretation  None         Social Determinants of Health affecting care:  None    Disposition:  The patient was discharged to home.     Impression & Plan        Medical Decision Making:  Ivan Gilman is a 38 year old female who presents with upper abdominal pain in the setting of vomiting and diarrhea.  A broad differential was considered.  She had a nonsurgical abdomen but describes very severe epigastric pain.  Belly labs were reassuring without signs of acute hepatitis, biliary obstruction or pancreatitis.  She is not pregnant.  Right upper quadrant ultrasound was borderline with some gallbladder sludge.  I was not confident that this was the etiology of her symptoms.  CT scan was obtained as well and shows no evidence of obstruction, perforated viscus, colitis or other etiology.  No definite findings of cholecystitis.  Discussed all the results with the patient after treatment.  She was feeling better.  She felt comfortable with the plan for discharge home.  We will go ahead and treat her empirically for possible gastritis versus peptic ulcer disease but she is asked to have a low threshold to return if any worsening given the possibility that this is her gallbladder.  Discussed all of the results with her.  At this time, with reasonable clinical  confidence, I do if she safe for discharge home.    Diagnosis:    ICD-10-CM    1. Epigastric pain  R10.13       2. Nausea vomiting and diarrhea  R11.2     R19.7            Discharge Medications:  Discharge Medication List as of 1/20/2024  7:02 AM        START taking these medications    Details   omeprazole (PRILOSEC) 40 MG DR capsule Take 1 capsule (40 mg) by mouth daily, Disp-14 capsule, R-0, E-Prescribe      oxyCODONE (ROXICODONE) 5 MG tablet Take 1 tablet (5 mg) by mouth every 6 hours as needed for severe pain, Disp-6 tablet, R-0, E-Prescribe      sucralfate (CARAFATE) 1 GM tablet Take 1 tablet (1 g) by mouth 4 times daily, Disp-60 tablet, R-0, E-Prescribe                1/20/2024   Indira Cano MD Cho, Amy C, MD  01/21/24 7787

## 2024-01-20 NOTE — ED TRIAGE NOTES
C/o of abdominal pain since Monday with nausea, vomiting and diarrhea. Pt states tonight the pain become worse.

## 2024-01-22 ENCOUNTER — OFFICE VISIT (OUTPATIENT)
Dept: FAMILY MEDICINE | Facility: CLINIC | Age: 39
End: 2024-01-22
Payer: COMMERCIAL

## 2024-01-22 VITALS
DIASTOLIC BLOOD PRESSURE: 89 MMHG | SYSTOLIC BLOOD PRESSURE: 109 MMHG | HEART RATE: 121 BPM | BODY MASS INDEX: 22.21 KG/M2 | HEIGHT: 72 IN | RESPIRATION RATE: 22 BRPM | OXYGEN SATURATION: 98 % | WEIGHT: 164 LBS

## 2024-01-22 DIAGNOSIS — K29.00 OTHER ACUTE GASTRITIS WITHOUT HEMORRHAGE: Primary | ICD-10-CM

## 2024-01-22 LAB
ERYTHROCYTE [DISTWIDTH] IN BLOOD BY AUTOMATED COUNT: 12.3 % (ref 10–15)
HCT VFR BLD AUTO: 40.9 % (ref 35–47)
HGB BLD-MCNC: 14.7 G/DL (ref 11.7–15.7)
MCH RBC QN AUTO: 31.3 PG (ref 26.5–33)
MCHC RBC AUTO-ENTMCNC: 35.9 G/DL (ref 31.5–36.5)
MCV RBC AUTO: 87 FL (ref 78–100)
PLATELET # BLD AUTO: 253 10E3/UL (ref 150–450)
RBC # BLD AUTO: 4.69 10E6/UL (ref 3.8–5.2)
WBC # BLD AUTO: 6.8 10E3/UL (ref 4–11)

## 2024-01-22 PROCEDURE — 36415 COLL VENOUS BLD VENIPUNCTURE: CPT | Performed by: FAMILY MEDICINE

## 2024-01-22 PROCEDURE — 85027 COMPLETE CBC AUTOMATED: CPT | Performed by: FAMILY MEDICINE

## 2024-01-22 PROCEDURE — 99213 OFFICE O/P EST LOW 20 MIN: CPT | Performed by: FAMILY MEDICINE

## 2024-01-22 RX ORDER — OXYCODONE HYDROCHLORIDE 5 MG/1
5 TABLET ORAL EVERY 6 HOURS PRN
Qty: 6 TABLET | Refills: 0 | Status: SHIPPED | OUTPATIENT
Start: 2024-01-22

## 2024-01-22 NOTE — PROGRESS NOTES
Assessment & Plan     (K29.00) Other acute gastritis without hemorrhage  (primary encounter diagnosis)  Comment: Reviewed ER visit and results. Normal CBC, CMP, lipase. CT/US unrevealing except for mild gallbladder distension. Exam and symptoms not totally consistent with gallbladder dysfunction. Suspect PUD vs gastritis - Recommended continue PPI and carafate. Hydrate with electrolyte containing fluids. Avoid fatty or acidic foods. Avoid ibuprofen.   Given inability to take ibuprofen and only partial pain relief with tylenol - gave additional short course of oxycodone.   Will repeat CBC given tachycardia today to assess for any new leukocytosis or anemia.   Recommended follow-up in 2-3 days if not improving. May take weeks to feel back to normal self.     Plan: CBC with platelets, oxyCODONE (ROXICODONE) 5 MG        tablet           Return in about 2 days (around 1/24/2024).    Aylin Love is a 38 year old, presenting for the following health issues:  RECHECK (ER follow up)      1/22/2024     8:50 AM   Additional Questions   Roomed by emilie   Accompanied by self     HPI                 ER note from 1/20/24 reviewed - CT/RUQ US without acute findings except for borderline distended gallbladder.   Started Monday - vomiting and diarrhea with poor PO intake. Friday evening, felt a bit better and able to tolerate some food.   Sat - had severe epigastric pain and went to the ER.   Didn't get Zofran at pharmacy, but has been taking omeprazole, carafate and as needed oxycodone.   Worse yesterday after eating yogurt.   Describes as intense pain that radiates into her back.   Does have periods of time of being pain-free. Worst at night time.   Last episode of diarrhea was Saturday.  No vomiting for several days.   Had chills last week, but no measured fever.         Objective    BP (!) 143/118 (BP Location: Left arm, Patient Position: Sitting, Cuff Size: Adult Regular)   Pulse (!) 128   Resp 22   Ht 1.854 m (6'  "1\")   Wt 74.4 kg (164 lb)   LMP 01/10/2024 (Approximate)   SpO2 98%   BMI 21.64 kg/m    Body mass index is 21.64 kg/m .  Physical Exam  Constitutional:       General: She is not in acute distress.  HENT:      Head: Normocephalic and atraumatic.   Eyes:      Conjunctiva/sclera: Conjunctivae normal.   Pulmonary:      Effort: Pulmonary effort is normal.   Abdominal:      General: There is no distension.      Palpations: Abdomen is soft.      Tenderness: There is no abdominal tenderness. There is no guarding or rebound.      Comments: No tenderness on exam. Noted some soreness afterwards in epigastric region.    Musculoskeletal:      Cervical back: Neck supple.   Skin:     General: Skin is warm and dry.   Neurological:      Mental Status: She is alert and oriented to person, place, and time.   Psychiatric:         Judgment: Judgment normal.                Signed Electronically by: Marya Mtz DO    "

## 2024-01-22 NOTE — PATIENT INSTRUCTIONS
1) Take omeprazole daily for 14 days (2 weeks)  2) Take the carafate 4x a day as long as you are having abdominal pain.   3) Avoid fatty foods or acidic foods (such as coffee, caffeine, alcohol).   4) Avoid ibuprofen for now    Reach out if not improving in the next 2-3 days.     Marya Mtz, DO

## 2024-02-18 ASSESSMENT — ACTIVITIES OF DAILY LIVING (ADL)
SQUATTING: NO DIFFICULTY
WALKING_BETWEEN_ROOMS: NO DIFFICULTY
PERFORMING_HEAVY_ACTIVITIES_AROUND_YOUR_HOME: NO DIFFICULTY
PUTTING_ON_YOUR_SHOES_OR_SOCKS: NO DIFFICULTY
ANY_OF_YOUR_USUAL_WORK,_HOUSEWORK_OR_SCHOOL_ACTIVITIES: NO DIFFICULTY
GETTING_INTO_OR_OUT_OF_A_CAR: NO DIFFICULTY
WALKING_A_MILE: NO DIFFICULTY
LEFS_SCORE(%): 0
LIFTING_AN_OBJECT,_LIKE_A_BAG_OF_GROCERIES_FROM_THE_FLOOR: NO DIFFICULTY
ROLLING_OVER_IN_BED: NO DIFFICULTY
STANDING_FOR_1_HOUR: NO DIFFICULTY
RUNNING_ON_EVEN_GROUND: NO DIFFICULTY
WALKING_2_BLOCKS: NO DIFFICULTY
MAKING_SHARP_TURNS_WHILE_RUNNING_FAST: NO DIFFICULTY
PERFORMING_LIGHT_ACTIVITIES_AROUND_YOUR_HOME: NO DIFFICULTY
RUNNING_ON_UNEVEN_GROUND: NO DIFFICULTY
SITTING_FOR_1_HOUR: NO DIFFICULTY
YOUR_USUAL_HOBBIES,_RECREATIONAL_OR_SPORTING_ACTIVITIES: QUITE A BIT OF DIFFICULTY
SHOPPING: NO DIFFICULTY
GOING_UP_OR_DOWN_10_STAIRS: NO DIFFICULTY
PLEASE_INDICATE_YOR_PRIMARY_REASON_FOR_REFERRAL_TO_THERAPY:: ANKLE AND/OR FOOT
LEFS_RAW_SCORE: 0
GETTING_INTO_AND_OUT_OF_A_BATH: NO DIFFICULTY

## 2024-02-20 ENCOUNTER — THERAPY VISIT (OUTPATIENT)
Dept: PHYSICAL THERAPY | Facility: CLINIC | Age: 39
End: 2024-02-20
Attending: FAMILY MEDICINE
Payer: COMMERCIAL

## 2024-02-20 DIAGNOSIS — M79.662 PAIN OF LEFT LOWER LEG: ICD-10-CM

## 2024-02-20 PROCEDURE — 97110 THERAPEUTIC EXERCISES: CPT | Mod: 59 | Performed by: PHYSICAL THERAPIST

## 2024-02-20 PROCEDURE — 97161 PT EVAL LOW COMPLEX 20 MIN: CPT | Mod: GP | Performed by: PHYSICAL THERAPIST

## 2024-02-20 PROCEDURE — 97530 THERAPEUTIC ACTIVITIES: CPT | Mod: GP | Performed by: PHYSICAL THERAPIST

## 2024-02-20 NOTE — PROGRESS NOTES
"PHYSICAL THERAPY EVALUATION  Type of Visit: Evaluation    See electronic medical record for Abuse and Falls Screening details.    Subjective   KEY PT FINDINGS:  1) Suspect lumbar etiology  2) Negative resisted testing  3) Negative significant loss of ankle DF  4) Negative tenderness to palpation    Physical Therapy Initial Evaluation: Subjective History     Injury/Condition Details:  Presenting Complaint Left shin pain   Onset Timing/Date 1/15/2024   Mechanism Has been a running     Training for a marathon last summer. Started in the left shin and felt the \"niggle\". History of bone stress reaction - managed with rest. Has been injury free for a while.   Iced, cross trained, strength trained, gave up the race eventually.   Slowed her pace to keep her heart rate down and around that time felt the symptoms come on.     Still feels it. Even when lifting and running.   Had twins 4 years ago and has worked really hard to get back to a full strength program.     Last ran 2 weeks ago. 20 min of TM - intervals. No symptoms during the intervals.     Strength training: can feel it \"on the bone\".     Deep core exercises - still has a split. Occasionally has a glute pain on the right that was present before babies.     Ideal: 3-4x/week running, 2x/week strength training, 1x/week rest day. Knows that when she strength trains she is less likely to get injured.      Symptom Behavior Details    Primary Symptoms Sporadic symptoms; Activity/position dependent, pain (Location: Mid-shin area, Quality: Aching/Throbbing/pressure)   Worst Pain 3-4/10 (with see below)   Symptom Provocators Running, lifting   Walking with the dog/kids   Best Pain 0/10    Symptom Relievers Activity modification   Time of day dependent? Worse in evening after activity   Recent symptom change? no change in symptoms     Prior Testing/Intervention for current condition:  Prior Tests  x-ray and MRI   Prior Treatment none     Lifestyle & General Medical " History:  Employment Health  @ [employer]   Usual physical activities  (within past year) See above.    Orthopaedic history See Epic   Notable medical history See Epic Chart   Patient Reported Health excellent         Presenting condition or subjective complaint: Possible shin splints  Date of onset: 01/15/24    Relevant medical history:     Dates & types of surgery:      Prior diagnostic imaging/testing results: MRI; X-ray     Prior therapy history for the same diagnosis, illness or injury: No      Living Environment  Social support: With family members   Type of home: House   Stairs to enter the home: Yes 3 Is there a railing: Yes   Ramp: No   Stairs inside the home: Yes 10 Is there a railing: Yes   Help at home: None  Equipment owned:       Employment: Yes Health   Hobbies/Interests: Running, reading    Patient goals for therapy: Run       Objective   Foot/Ankle Physical Therapy Examination    Dynamic Movement Screen:  2 leg stance: equal weight bearing, normal appearing alignment, boarderline pes cavus bilaterally  2 leg squat: Normal, maintains good arch height,     1 leg stance: Right: Good; Left: Good  1 leg squat:   Right: Small depth but good control through pelvifemoral line  Left: Small depth but good control through pelvifemoral line    Gait: Katie    Range of motion:      Left (degrees) Right (degrees)   Knee Straight 12 deg 12 deg   Knee Bent (knee to wall) 10cm 12cm   Toes Extended/Knee Bent WFL WFL   Goniometer All motions WFL All motions WFL     Palpation:     Tender to palpation at the following structures: none  NOT tender to palpation at the following structures: tibia, posterior tib, fibula, fibularis musculature     Resisted Testing:     Right Left   Plantarflexion (Toe Raises) - -   Dorsiflexion - -   Eversion - -   Inversion - -   Great Toe Extension - -   Great Toe Flexion - -     Foot Screen:   rearfoot hypomobility  Lumbar screen  Limited ROM into flexion > does  not demonstrate reversal of the curve.   Limited lumbar extension > compensation with knee flexion.   +slr on the left side with tension through entire posterior left leg.     Assessment & Plan   CLINICAL IMPRESSIONS  Medical Diagnosis: Left lower leg pain    Treatment Diagnosis: Left leg pain secondary to lumbar dysfunction   Impression/Assessment: Patient is a 38 year old female with left leg complaints.  The following significant findings have been identified: Pain, Decreased ROM/flexibility, Decreased joint mobility, and Decreased activity tolerance. These impairments interfere with their ability to perform recreational activities and community mobility as compared to previous level of function.     Clinical Decision Making (Complexity):  Clinical Presentation: Stable/Uncomplicated  Clinical Presentation Rationale: based on medical and personal factors listed in PT evaluation  Clinical Decision Making (Complexity): Low complexity    PLAN OF CARE  Treatment Interventions:  Interventions: Manual Therapy, Neuromuscular Re-education, Therapeutic Activity, Therapeutic Exercise    Long Term Goals     PT Goal 1  Goal Identifier: Return to running  Goal Description: Patient will return to previous levels of running without onset of lower leg pain  Rationale: to maximize safety and independence within the community  Target Date: 05/15/24      Frequency of Treatment: 2x/month  Duration of Treatment: 3 months    Recommended Referrals to Other Professionals: Physical Therapy  Education Assessment:   Learner/Method: Patient;No Barriers to Learning    Risks and benefits of evaluation/treatment have been explained.   Patient/Family/caregiver agrees with Plan of Care.     Evaluation Time:     PT Eval, Low Complexity Minutes (23312): 21     Signing Clinician: Kimberli Dodge PT

## 2024-03-12 ENCOUNTER — THERAPY VISIT (OUTPATIENT)
Dept: PHYSICAL THERAPY | Facility: CLINIC | Age: 39
End: 2024-03-12
Payer: COMMERCIAL

## 2024-03-12 DIAGNOSIS — M79.662 PAIN OF LEFT LOWER LEG: Primary | ICD-10-CM

## 2024-03-12 PROCEDURE — 97140 MANUAL THERAPY 1/> REGIONS: CPT | Mod: GP | Performed by: PHYSICAL THERAPIST

## 2024-03-12 PROCEDURE — 97110 THERAPEUTIC EXERCISES: CPT | Mod: GP | Performed by: PHYSICAL THERAPIST

## 2024-03-25 ENCOUNTER — THERAPY VISIT (OUTPATIENT)
Dept: PHYSICAL THERAPY | Facility: CLINIC | Age: 39
End: 2024-03-25
Payer: COMMERCIAL

## 2024-03-25 DIAGNOSIS — M79.662 PAIN OF LEFT LOWER LEG: Primary | ICD-10-CM

## 2024-03-25 PROCEDURE — 97110 THERAPEUTIC EXERCISES: CPT | Mod: GP | Performed by: PHYSICAL THERAPIST

## 2024-03-25 PROCEDURE — 97140 MANUAL THERAPY 1/> REGIONS: CPT | Mod: GP | Performed by: PHYSICAL THERAPIST

## 2024-03-25 NOTE — PROGRESS NOTES
2D Video Running Gait Analysis   Reproduction of  [facility] 2D Video Analysis    Maninder Chang PT, PhD 2015     SAGITTAL PLANE OBSERVATIONS  Variable Right Left   Foot Strike Pattern Heel strike Heel strike   IC Tibial Inclination Angle (within 5  of vertical) Excessive inclination Excessive inclination   IC KF Angle (~20 ) Mild decrease Mild decrease   MS KF Angle (~40 ) Appropriate Appropriate   MS Ankle DF Angle   (knee over toes) Appropriate Appropriate   Push-off Hip Ext Angle (0-5 ) Appropriate Appropriate   Anterior Pelvic Tilt (5-10 ) Increased Increased   Lumbar Lordosis Increased Increased   COM Excursion (6-8 cm) Appropriate Appropriate     Forward Trunk Lean (5-10  forward) Appropriate       FRONTAL PLANE OBSERVATIONS  Variable Right Left   Trunk Side Bend (vertical) Mild ipsilateral Mild ipsilateral   Lateral Pelvic Drop   (males 3-5 , females 4-7 ) Mild contralateral Mild contralateral   Knee Center Position (midline) Appropriate Appropriate   Knee Separation   (slight separation) Appropriate Appropriate   Foot-COM Position   (speed dependent) Appropriate Appropriate   Rearfoot Position   (neutral or mild pronation) Appropriate Appropriate   Forefoot Position   (neutral or mild abduction) Appropriate Appropriate     Other Observations  Soni 160 bpm   Footwear Chasity   Trunk Rotation Appropriate   Arm Swing Mild increase in arch carriage   Heel Height (symmetrical) Appropriate

## 2024-05-06 ENCOUNTER — THERAPY VISIT (OUTPATIENT)
Dept: PHYSICAL THERAPY | Facility: CLINIC | Age: 39
End: 2024-05-06
Payer: COMMERCIAL

## 2024-05-06 DIAGNOSIS — M79.662 PAIN OF LEFT LOWER LEG: Primary | ICD-10-CM

## 2024-05-06 PROCEDURE — 97110 THERAPEUTIC EXERCISES: CPT | Mod: GP | Performed by: PHYSICAL THERAPIST

## 2024-07-14 ENCOUNTER — HEALTH MAINTENANCE LETTER (OUTPATIENT)
Age: 39
End: 2024-07-14

## 2024-09-04 ENCOUNTER — THERAPY VISIT (OUTPATIENT)
Dept: PHYSICAL THERAPY | Facility: CLINIC | Age: 39
End: 2024-09-04
Payer: COMMERCIAL

## 2024-09-04 DIAGNOSIS — M79.662 PAIN OF LEFT LOWER LEG: Primary | ICD-10-CM

## 2024-09-04 PROCEDURE — 97110 THERAPEUTIC EXERCISES: CPT | Mod: GP | Performed by: PHYSICAL THERAPIST

## 2024-09-04 PROCEDURE — 20560 NDL INSJ W/O NJX 1 OR 2 MUSC: CPT | Mod: GA | Performed by: PHYSICAL THERAPIST

## 2024-09-04 PROCEDURE — 97140 MANUAL THERAPY 1/> REGIONS: CPT | Mod: GP | Performed by: PHYSICAL THERAPIST

## 2024-09-10 ENCOUNTER — THERAPY VISIT (OUTPATIENT)
Dept: PHYSICAL THERAPY | Facility: CLINIC | Age: 39
End: 2024-09-10
Payer: COMMERCIAL

## 2024-09-10 DIAGNOSIS — M79.662 PAIN OF LEFT LOWER LEG: Primary | ICD-10-CM

## 2024-09-10 PROCEDURE — 97110 THERAPEUTIC EXERCISES: CPT | Mod: GP | Performed by: PHYSICAL THERAPIST

## 2024-09-10 PROCEDURE — 97140 MANUAL THERAPY 1/> REGIONS: CPT | Mod: GP | Performed by: PHYSICAL THERAPIST

## 2024-09-10 PROCEDURE — 20560 NDL INSJ W/O NJX 1 OR 2 MUSC: CPT | Performed by: PHYSICAL THERAPIST

## 2024-09-12 ENCOUNTER — OFFICE VISIT (OUTPATIENT)
Dept: URGENT CARE | Facility: URGENT CARE | Age: 39
End: 2024-09-12
Payer: COMMERCIAL

## 2024-09-12 VITALS
SYSTOLIC BLOOD PRESSURE: 122 MMHG | HEART RATE: 79 BPM | TEMPERATURE: 97.7 F | RESPIRATION RATE: 15 BRPM | OXYGEN SATURATION: 98 % | WEIGHT: 165 LBS | BODY MASS INDEX: 22.35 KG/M2 | DIASTOLIC BLOOD PRESSURE: 72 MMHG | HEIGHT: 72 IN

## 2024-09-12 DIAGNOSIS — L03.113 CELLULITIS OF RIGHT UPPER EXTREMITY: Primary | ICD-10-CM

## 2024-09-12 PROCEDURE — 99213 OFFICE O/P EST LOW 20 MIN: CPT | Performed by: STUDENT IN AN ORGANIZED HEALTH CARE EDUCATION/TRAINING PROGRAM

## 2024-09-12 RX ORDER — CEFDINIR 300 MG/1
300 CAPSULE ORAL 2 TIMES DAILY
Qty: 14 CAPSULE | Refills: 0 | Status: SHIPPED | OUTPATIENT
Start: 2024-09-12 | End: 2024-09-19

## 2024-09-12 NOTE — PROGRESS NOTES
ASSESSMENT & PLAN:   Diagnoses and all orders for this visit:  Cellulitis of right upper extremity  -     cefdinir (OMNICEF) 300 MG capsule; Take 1 capsule (300 mg) by mouth 2 times daily for 7 days.    Bug bite right forearm 3 days ago with spreading redness consistent with cellulitis secondary to bite. Cefdinir x 7 days.     At the end of the encounter, I discussed results, diagnosis, medications. Discussed red flags for immediate return to clinic/ER, as well as indications for follow up if no improvement. Patient and/or caregiver understood and agreed to plan. Patient was stable for discharge.    There are no Patient Instructions on file for this visit.    Return in about 3 days (around 9/15/2024) for symptoms not improving, sooner if needed.    ------------------------------------------------------------------------  SUBJECTIVE  History was obtained from patient.    Patient presents with:  Urgent Care: Bug bite on right arm on Monday, now red and line going up arm.     HPI  Ivan Gilman is a(n) 39 year old female presenting to urgent care for bug bite to right forearm that occurred 3 days ago. The next day had spreading redness around it. Noticed streaking up arm last night. Thinks she was bit while in her basement, maybe by a spider or centipede. Denies fever or chills. Using hydrocortisone cream for itching.    Review of Systems    Current Outpatient Medications   Medication Sig Dispense Refill    cefdinir (OMNICEF) 300 MG capsule Take 1 capsule (300 mg) by mouth 2 times daily for 7 days. 14 capsule 0    omeprazole (PRILOSEC) 40 MG DR capsule Take 1 capsule (40 mg) by mouth daily 14 capsule 0    ondansetron (ZOFRAN ODT) 4 MG ODT tab Take 1-2 tablets (4-8 mg) by mouth every 8 hours as needed for nausea or vomiting (Patient not taking: Reported on 1/22/2024) 12 tablet 0    oxyCODONE (ROXICODONE) 5 MG tablet Take 1 tablet (5 mg) by mouth every 6 hours as needed for severe pain 6 tablet 0    sucralfate (CARAFATE)  "1 GM tablet Take 1 tablet (1 g) by mouth 4 times daily 60 tablet 0     Problem List:  2020:  delivery delivered  2019-10: Hyperemesis gravidarum with dehydration  2019: Lumbago  2018: Achilles tendinitis of both lower extremities  2018: Rupture of ovarian cyst    No Known Allergies      OBJECTIVE  Vitals:    24 1448   BP: 122/72   Pulse: 79   Resp: 15   Temp: 97.7  F (36.5  C)   TempSrc: Temporal   SpO2: 98%   Weight: 74.8 kg (165 lb)   Height: 1.854 m (6' 1\")     Physical Exam   GENERAL: healthy, alert, no acute distress.   PSYCH: mentation appears normal. Normal affect  SKIN: right UE - anterior proximal forearm with punctate scab, surrounding erythema approx 5 cm in diameter. Linear erythema up arm approx 5 cm in length. Area tender, slightly warm. No fluctuance, drainage, crusting. UE sensation intact. Normal ROM elbow and wrist.    No results found for any visits on 24.  "

## 2024-09-20 ENCOUNTER — THERAPY VISIT (OUTPATIENT)
Dept: PHYSICAL THERAPY | Facility: CLINIC | Age: 39
End: 2024-09-20
Payer: COMMERCIAL

## 2024-09-20 DIAGNOSIS — M79.662 PAIN OF LEFT LOWER LEG: Primary | ICD-10-CM

## 2024-09-20 PROCEDURE — 20560 NDL INSJ W/O NJX 1 OR 2 MUSC: CPT | Performed by: PHYSICAL THERAPIST

## 2024-09-20 PROCEDURE — 97140 MANUAL THERAPY 1/> REGIONS: CPT | Mod: GP | Performed by: PHYSICAL THERAPIST

## 2024-12-08 ASSESSMENT — ACTIVITIES OF DAILY LIVING (ADL)
ANY_OF_YOUR_USUAL_WORK,_HOUSEWORK_OR_SCHOOL_ACTIVITIES: NO DIFFICULTY
PAIN: THE SYMPTOM AFFECTS MY ACTIVITY SLIGHTLY
PLEASE_INDICATE_YOR_PRIMARY_REASON_FOR_REFERRAL_TO_THERAPY:: KNEE
LEFS_SCORE(%): 0
PUTTING_ON_YOUR_SHOES_OR_SOCKS: NO DIFFICULTY
SIT WITH YOUR KNEE BENT: ACTIVITY IS NOT DIFFICULT
KNEEL ON THE FRONT OF YOUR KNEE: ACTIVITY IS NOT DIFFICULT
RISE FROM A CHAIR: ACTIVITY IS NOT DIFFICULT
SQUAT: ACTIVITY IS MINIMALLY DIFFICULT
WALKING_BETWEEN_ROOMS: NO DIFFICULTY
WALK: ACTIVITY IS MINIMALLY DIFFICULT
GIVING WAY, BUCKLING OR SHIFTING OF KNEE: I DO NOT HAVE THE SYMPTOM
STANDING_FOR_1_HOUR: NO DIFFICULTY
GO UP STAIRS: ACTIVITY IS MINIMALLY DIFFICULT
GO DOWN STAIRS: ACTIVITY IS MINIMALLY DIFFICULT
SQUAT: ACTIVITY IS MINIMALLY DIFFICULT
STAND: ACTIVITY IS NOT DIFFICULT
LIMPING: I DO NOT HAVE THE SYMPTOM
GETTING_INTO_AND_OUT_OF_A_BATH: NO DIFFICULTY
KNEE_ACTIVITY_OF_DAILY_LIVING_SCORE: 91.43
GIVING WAY, BUCKLING OR SHIFTING OF KNEE: I DO NOT HAVE THE SYMPTOM
HOW_WOULD_YOU_RATE_THE_CURRENT_FUNCTION_OF_YOUR_KNEE_DURING_YOUR_USUAL_DAILY_ACTIVITIES_ON_A_SCALE_FROM_0_TO_100_WITH_100_BEING_YOUR_LEVEL_OF_KNEE_FUNCTION_PRIOR_TO_YOUR_INJURY_AND_0_BEING_THE_INABILITY_TO_PERFORM_ANY_OF_YOUR_USUAL_DAILY_ACTIVITIES?: 80
SWELLING: I DO NOT HAVE THE SYMPTOM
RUNNING_ON_EVEN_GROUND: A LITTLE BIT OF DIFFICULTY
PERFORMING_HEAVY_ACTIVITIES_AROUND_YOUR_HOME: A LITTLE BIT OF DIFFICULTY
SITTING_FOR_1_HOUR: NO DIFFICULTY
HOW_WOULD_YOU_RATE_THE_OVERALL_FUNCTION_OF_YOUR_KNEE_DURING_YOUR_USUAL_DAILY_ACTIVITIES?: NEARLY NORMAL
PAIN: THE SYMPTOM AFFECTS MY ACTIVITY SLIGHTLY
PERFORMING_LIGHT_ACTIVITIES_AROUND_YOUR_HOME: NO DIFFICULTY
HOW_WOULD_YOU_RATE_THE_CURRENT_FUNCTION_OF_YOUR_KNEE_DURING_YOUR_USUAL_DAILY_ACTIVITIES_ON_A_SCALE_FROM_0_TO_100_WITH_100_BEING_YOUR_LEVEL_OF_KNEE_FUNCTION_PRIOR_TO_YOUR_INJURY_AND_0_BEING_THE_INABILITY_TO_PERFORM_ANY_OF_YOUR_USUAL_DAILY_ACTIVITIES?: 80
GO DOWN STAIRS: ACTIVITY IS MINIMALLY DIFFICULT
WEAKNESS: I DO NOT HAVE THE SYMPTOM
WALKING_2_BLOCKS: NO DIFFICULTY
ROLLING_OVER_IN_BED: NO DIFFICULTY
STAND: ACTIVITY IS NOT DIFFICULT
SQUATTING: NO DIFFICULTY
LIMPING: I DO NOT HAVE THE SYMPTOM
RUNNING_ON_UNEVEN_GROUND: A LITTLE BIT OF DIFFICULTY
HOW_WOULD_YOU_RATE_THE_OVERALL_FUNCTION_OF_YOUR_KNEE_DURING_YOUR_USUAL_DAILY_ACTIVITIES?: NEARLY NORMAL
AS_A_RESULT_OF_YOUR_KNEE_INJURY,_HOW_WOULD_YOU_RATE_YOUR_CURRENT_LEVEL_OF_DAILY_ACTIVITY?: NEARLY NORMAL
PLEASE_INDICATE_YOR_PRIMARY_REASON_FOR_REFERRAL_TO_THERAPY:: FOOT AND/OR ANKLE
RISE FROM A CHAIR: ACTIVITY IS NOT DIFFICULT
GO UP STAIRS: ACTIVITY IS MINIMALLY DIFFICULT
LIFTING_AN_OBJECT,_LIKE_A_BAG_OF_GROCERIES_FROM_THE_FLOOR: NO DIFFICULTY
STIFFNESS: I DO NOT HAVE THE SYMPTOM
SHOPPING: NO DIFFICULTY
RAW_SCORE: 64
MAKING_SHARP_TURNS_WHILE_RUNNING_FAST: A LITTLE BIT OF DIFFICULTY
SWELLING: I DO NOT HAVE THE SYMPTOM
GETTING_INTO_OR_OUT_OF_A_CAR: NO DIFFICULTY
WALKING_A_MILE: NO DIFFICULTY
STIFFNESS: I DO NOT HAVE THE SYMPTOM
KNEEL ON THE FRONT OF YOUR KNEE: ACTIVITY IS NOT DIFFICULT
YOUR_USUAL_HOBBIES,_RECREATIONAL_OR_SPORTING_ACTIVITIES: MODERATE DIFFICULTY
SIT WITH YOUR KNEE BENT: ACTIVITY IS NOT DIFFICULT
LEFS_RAW_SCORE: 0
KNEE_ACTIVITY_OF_DAILY_LIVING_SUM: 64
WEAKNESS: I DO NOT HAVE THE SYMPTOM
AS_A_RESULT_OF_YOUR_KNEE_INJURY,_HOW_WOULD_YOU_RATE_YOUR_CURRENT_LEVEL_OF_DAILY_ACTIVITY?: NEARLY NORMAL
WALK: ACTIVITY IS MINIMALLY DIFFICULT
GOING_UP_OR_DOWN_10_STAIRS: NO DIFFICULTY

## 2024-12-09 ENCOUNTER — THERAPY VISIT (OUTPATIENT)
Dept: PHYSICAL THERAPY | Facility: CLINIC | Age: 39
End: 2024-12-09
Payer: COMMERCIAL

## 2024-12-09 DIAGNOSIS — M25.561 KNEE PAIN, RIGHT: Primary | ICD-10-CM

## 2024-12-09 PROCEDURE — 97161 PT EVAL LOW COMPLEX 20 MIN: CPT | Mod: GP | Performed by: PHYSICAL THERAPIST

## 2024-12-09 PROCEDURE — 97110 THERAPEUTIC EXERCISES: CPT | Mod: GP | Performed by: PHYSICAL THERAPIST

## 2024-12-09 NOTE — PROGRESS NOTES
"PHYSICAL THERAPY EVALUATION  Type of Visit: Evaluation       Fall Risk Screen:  Fall screen completed by: PT  Have you fallen 2 or more times in the past year?: No  Have you fallen and had an injury in the past year?: No  Is patient a fall risk?: No    Subjective   KEY PT FINDINGS:  1) Full Knee ROM, no effusion  2) Symmetrical hip ROM  3) Mild hip weakness, has HEP to address    Physical Therapy Initial Evaluation: Subjective History     Injury/Condition Details:  Presenting Complaint Right knee   Onset Timing/Date Spring 2024   Mechanism Had been having inferior knee pain with the past marathon cycle training.   Now it is shifted to the inside of the knee cap and occasional outside of the knee.   It has been feeling better but wants to make sure it is all good to go.   Feels like she wasn't activating the right muscles.      Symptom Behavior Details    Primary Symptoms Sporadic symptoms; Activity/position dependent, pain (Location: inside of the kneecap, outside of the kneecap, Quality: Aching/Throbbing and \"weird movement\"), stiffness  Does not report mechanical symptoms.   It moves differently than the left side sometimes.   Nothing was every very consistently painful.    Worst Pain 2/10 (with See below)   Symptom Provocators Side to side movement, squatting  Stairs (up/down)   Best Pain 0/10    Symptom Relievers Activity modification   Time of day dependent? No   Recent symptom change? symptoms improving     Prior Testing/Intervention for current condition:  Prior Tests none   Prior Treatment none     Lifestyle & General Medical History:  Employment See previous note   Usual physical activities  (within past year) Lifting has been the focus: couple of leg days, couple of arm days  Squats, DL, split squat, lateral movements.     Has done 5 runs over the past few weeks. Easing into it slowly.   Ran 3 miles on Saturday and it felt good.    Orthopaedic history See Epic Chart   Notable medical history See Epic Chart "   Patient Reported Health good           Presenting condition or subjective complaint: (Patient-Rptd) Running injury  Date of onset: 04/01/24    Relevant medical history:     Dates & types of surgery:      Prior diagnostic imaging/testing results:       Prior therapy history for the same diagnosis, illness or injury: (Patient-Rptd) Yes (Patient-Rptd) Physical therapy, dry needling      Living Environment  Social support: (Patient-Rptd) With family members   Type of home: (Patient-Rptd) House   Stairs to enter the home: (Patient-Rptd) Yes (Patient-Rptd) 15 Is there a railing: (Patient-Rptd) Yes     Ramp: (Patient-Rptd) No   Stairs inside the home: (Patient-Rptd) Yes (Patient-Rptd) 15 Is there a railing: (Patient-Rptd) Yes     Help at home: (Patient-Rptd) None  Equipment owned:       Employment: (Patient-Rptd) Yes (Patient-Rptd) Health   Hobbies/Interests: (Patient-Rptd) Running, lifting    Patient goals for therapy: (Patient-Rptd) Run       Objective       Lower Extremity Physical Therapy Examination    Dynamic Movement Screen:  2 leg stance: Equal weight bearing, normal appearing alignment  2 leg squat:well balanced between hips/knees, maintains alignment    1 leg stance:   Right: normal  Left: normal    1 leg squat:   Right: proprioceptive challenge and mild femoral IR/ADD, limited depth due to control  Left: proprioceptive challenge and mild femoral IR/ADD, limited depth due to control    Gait: Normal    Knee Joint ROM: full, hyperextension present, no pain with OP into either direction     Hip Joint ROM   Flex Ext ER IR ABD ADD   Right - deg - deg 64 deg 32 deg - deg - deg   Left - deg - deg 64 deg 34 deg - deg - deg     Lower Extremity Muscle Strength (x/5)   Hip IR Hip ER Knee Ext Hip ABD Hip Ext Knee Flex   Right  5-/5 5-/5 5/5 5-/5 5-/5 5/5   Left 5-/5 5-/5 5/5 5-/5 5-/5 5/5     Basic Muscle Activation:  Transversus Abdominus: Next  Quadriceps: Right: Normal, Left: Normal    Knee Joint Effusion (Stroke  Test Assessment):  Right: None  Left: None    Lower Extremity Flexibility Screen:  Hamstrings (Supine SLR): Right: -; Left: -  Gastroc (Supine Active DF): Right: -; Left: -  Quadriceps (Prone KF): Right: -; Left: -  Hip Flexors (Storm Test): Right: -; Left: -  ITB (Minerva Test): Right: -; Left: -    Palpation:   Tender to palpation at the following structures: None  NOT tender to palpation at the following structures: Patellar boarders, medial and lateral joint line      Assessment & Plan   CLINICAL IMPRESSIONS  Medical Diagnosis: Right knee pain    Treatment Diagnosis: Right knee pain   Impression/Assessment: Patient is a 39 year old female with right knee complaints.  The following significant findings have been identified: Pain, Impaired balance, Decreased proprioception, Impaired muscle performance, and Decreased activity tolerance. These impairments interfere with their ability to perform self care tasks, recreational activities, household mobility, and community mobility as compared to previous level of function.     Clinical Decision Making (Complexity):  Clinical Presentation: Stable/Uncomplicated  Clinical Presentation Rationale: based on medical and personal factors listed in PT evaluation  Clinical Decision Making (Complexity): Low complexity    PLAN OF CARE  Treatment Interventions:  Interventions: Manual Therapy, Neuromuscular Re-education, Therapeutic Activity, Therapeutic Exercise    Long Term Goals     PT Goal 1  Goal Identifier: Running  Goal Description: Patient to participate in running 4x/week without knee pain  Rationale: to maximize safety and independence within the community  Target Date: 02/03/25      Frequency of Treatment: 2x/month  Duration of Treatment: 2 months    Recommended Referrals to Other Professionals: Physical Therapy  Education Assessment:   Learner/Method: Patient;No Barriers to Learning    Risks and benefits of evaluation/treatment have been explained.    Patient/Family/caregiver agrees with Plan of Care.     Evaluation Time:     PT Muna, Low Complexity Minutes (67265): 17     Signing Clinician: Kimberli Dodge PT

## 2025-05-07 SDOH — HEALTH STABILITY: PHYSICAL HEALTH: ON AVERAGE, HOW MANY DAYS PER WEEK DO YOU ENGAGE IN MODERATE TO STRENUOUS EXERCISE (LIKE A BRISK WALK)?: 6 DAYS

## 2025-05-07 SDOH — HEALTH STABILITY: PHYSICAL HEALTH: ON AVERAGE, HOW MANY MINUTES DO YOU ENGAGE IN EXERCISE AT THIS LEVEL?: 40 MIN

## 2025-05-07 ASSESSMENT — SOCIAL DETERMINANTS OF HEALTH (SDOH): HOW OFTEN DO YOU GET TOGETHER WITH FRIENDS OR RELATIVES?: ONCE A WEEK

## 2025-05-12 ENCOUNTER — PATIENT OUTREACH (OUTPATIENT)
Dept: ONCOLOGY | Facility: CLINIC | Age: 40
End: 2025-05-12

## 2025-05-12 ENCOUNTER — OFFICE VISIT (OUTPATIENT)
Dept: FAMILY MEDICINE | Facility: CLINIC | Age: 40
End: 2025-05-12
Payer: COMMERCIAL

## 2025-05-12 VITALS
RESPIRATION RATE: 18 BRPM | TEMPERATURE: 98.2 F | OXYGEN SATURATION: 100 % | HEART RATE: 84 BPM | SYSTOLIC BLOOD PRESSURE: 132 MMHG | DIASTOLIC BLOOD PRESSURE: 76 MMHG | WEIGHT: 171.6 LBS | HEIGHT: 72 IN | BODY MASS INDEX: 23.24 KG/M2

## 2025-05-12 DIAGNOSIS — K92.1 HEMATOCHEZIA: ICD-10-CM

## 2025-05-12 DIAGNOSIS — Z80.3 FAMILY HISTORY OF MALIGNANT NEOPLASM OF BREAST: ICD-10-CM

## 2025-05-12 DIAGNOSIS — Z13.0 SCREENING, IRON DEFICIENCY ANEMIA: ICD-10-CM

## 2025-05-12 DIAGNOSIS — Z13.1 SCREENING FOR DIABETES MELLITUS: ICD-10-CM

## 2025-05-12 DIAGNOSIS — Z00.00 ROUTINE GENERAL MEDICAL EXAMINATION AT A HEALTH CARE FACILITY: Primary | ICD-10-CM

## 2025-05-12 DIAGNOSIS — Z13.220 LIPID SCREENING: ICD-10-CM

## 2025-05-12 DIAGNOSIS — Z83.719 FAMILY HISTORY OF COLONIC POLYPS: ICD-10-CM

## 2025-05-12 LAB
ALBUMIN SERPL BCG-MCNC: 4.8 G/DL (ref 3.5–5.2)
ALP SERPL-CCNC: 48 U/L (ref 40–150)
ALT SERPL W P-5'-P-CCNC: 14 U/L (ref 0–50)
ANION GAP SERPL CALCULATED.3IONS-SCNC: 9 MMOL/L (ref 7–15)
AST SERPL W P-5'-P-CCNC: 23 U/L (ref 0–45)
BILIRUB SERPL-MCNC: 0.6 MG/DL
BUN SERPL-MCNC: 14.3 MG/DL (ref 6–20)
CALCIUM SERPL-MCNC: 9.4 MG/DL (ref 8.8–10.4)
CHLORIDE SERPL-SCNC: 106 MMOL/L (ref 98–107)
CHOLEST SERPL-MCNC: 154 MG/DL
CREAT SERPL-MCNC: 0.86 MG/DL (ref 0.51–0.95)
EGFRCR SERPLBLD CKD-EPI 2021: 88 ML/MIN/1.73M2
ERYTHROCYTE [DISTWIDTH] IN BLOOD BY AUTOMATED COUNT: 12.5 % (ref 10–15)
EST. AVERAGE GLUCOSE BLD GHB EST-MCNC: 94 MG/DL
FASTING STATUS PATIENT QL REPORTED: YES
GLUCOSE SERPL-MCNC: 96 MG/DL (ref 70–99)
GLUCOSE SERPL-MCNC: 96 MG/DL (ref 70–99)
HBA1C MFR BLD: 4.9 % (ref 0–5.6)
HCO3 SERPL-SCNC: 26 MMOL/L (ref 22–29)
HCT VFR BLD AUTO: 40.1 % (ref 35–47)
HDLC SERPL-MCNC: 55 MG/DL
HGB BLD-MCNC: 13.7 G/DL (ref 11.7–15.7)
LDLC SERPL CALC-MCNC: 92 MG/DL
MCH RBC QN AUTO: 31.1 PG (ref 26.5–33)
MCHC RBC AUTO-ENTMCNC: 34.2 G/DL (ref 31.5–36.5)
MCV RBC AUTO: 91 FL (ref 78–100)
NONHDLC SERPL-MCNC: 99 MG/DL
PLATELET # BLD AUTO: 178 10E3/UL (ref 150–450)
POTASSIUM SERPL-SCNC: 4 MMOL/L (ref 3.4–5.3)
PROT SERPL-MCNC: 7.4 G/DL (ref 6.4–8.3)
RBC # BLD AUTO: 4.41 10E6/UL (ref 3.8–5.2)
SODIUM SERPL-SCNC: 141 MMOL/L (ref 135–145)
TRIGL SERPL-MCNC: 34 MG/DL
WBC # BLD AUTO: 3 10E3/UL (ref 4–11)

## 2025-05-12 PROCEDURE — 83036 HEMOGLOBIN GLYCOSYLATED A1C: CPT | Performed by: STUDENT IN AN ORGANIZED HEALTH CARE EDUCATION/TRAINING PROGRAM

## 2025-05-12 PROCEDURE — 99213 OFFICE O/P EST LOW 20 MIN: CPT | Mod: 25 | Performed by: STUDENT IN AN ORGANIZED HEALTH CARE EDUCATION/TRAINING PROGRAM

## 2025-05-12 PROCEDURE — 3075F SYST BP GE 130 - 139MM HG: CPT | Performed by: STUDENT IN AN ORGANIZED HEALTH CARE EDUCATION/TRAINING PROGRAM

## 2025-05-12 PROCEDURE — 99395 PREV VISIT EST AGE 18-39: CPT | Performed by: STUDENT IN AN ORGANIZED HEALTH CARE EDUCATION/TRAINING PROGRAM

## 2025-05-12 PROCEDURE — 80053 COMPREHEN METABOLIC PANEL: CPT | Performed by: STUDENT IN AN ORGANIZED HEALTH CARE EDUCATION/TRAINING PROGRAM

## 2025-05-12 PROCEDURE — 85027 COMPLETE CBC AUTOMATED: CPT | Performed by: STUDENT IN AN ORGANIZED HEALTH CARE EDUCATION/TRAINING PROGRAM

## 2025-05-12 PROCEDURE — 80061 LIPID PANEL: CPT | Performed by: STUDENT IN AN ORGANIZED HEALTH CARE EDUCATION/TRAINING PROGRAM

## 2025-05-12 PROCEDURE — 1126F AMNT PAIN NOTED NONE PRSNT: CPT | Performed by: STUDENT IN AN ORGANIZED HEALTH CARE EDUCATION/TRAINING PROGRAM

## 2025-05-12 PROCEDURE — G2211 COMPLEX E/M VISIT ADD ON: HCPCS | Performed by: STUDENT IN AN ORGANIZED HEALTH CARE EDUCATION/TRAINING PROGRAM

## 2025-05-12 PROCEDURE — 36415 COLL VENOUS BLD VENIPUNCTURE: CPT | Performed by: STUDENT IN AN ORGANIZED HEALTH CARE EDUCATION/TRAINING PROGRAM

## 2025-05-12 PROCEDURE — 3078F DIAST BP <80 MM HG: CPT | Performed by: STUDENT IN AN ORGANIZED HEALTH CARE EDUCATION/TRAINING PROGRAM

## 2025-05-12 ASSESSMENT — PAIN SCALES - GENERAL: PAINLEVEL_OUTOF10: NO PAIN (0)

## 2025-05-12 NOTE — PROGRESS NOTES
New Patient Oncology Nurse Navigator Note     Referring provider: Alejandro Bearden PA-C      Referring Clinic/Organization:     [facility]        Referred to (specialty:) Genetic Counseling and Cancer Risk Management     Requested provider (if applicable): NA      Date Referral Received: May 12, 2025     Evaluation for:    Z83.719 (ICD-10-CM) - Family history of colonic polyps   Z80.3 (ICD-10-CM) - Family history of malignant neoplasm of breast     Payor: MEDICA / Plan: MEDICA VANTAGEPLUS FULLY INSURED / Product Type: HMO /     May 12, 2025  Referral received and reviewed.   Sent to NPS to schedule.     Padmini NGUYỄNN, RN, OCN  Oncology Nurse Navigator   [facility]  Cancer Care Service Line   New Patient Hem/Onc Scheduling / Referrals: [phone] (fax: [phone] )

## 2025-05-12 NOTE — PATIENT INSTRUCTIONS
Patient Education     Colon Cancer: Check with Dad - Below is criteria to screen for colon cancer early. If any of below, then start at 40, otherwise, likely start at 45.    Colorectal cancer (CRC)  Documented advanced polyp with ANY of the following:  Advanced adenoma  -Adenoma size >=1 cm  -Adenoma with high-grade dysplasia  -Adenoma with villous elements  Advanced serrated lesion  -Sessile serrated polyp (SSP) >=1 cm  -Traditional serrated adenoma >=1 cm  -SSP with cytologic dysplasia    Breast Cancer: monthly self exams and start 40 if no enhanced risk after discussing with genetics counselor or sooner if indicated.     Schedule genetics appointment to discuss breast and colon cancer risk and develop plan.     Info About My Practice:   Thank you for coming to see me today! Here are a couple of pieces of information about my schedule and communication practices.     I am not in the clinic on Tuesdays. Non-urgent calls and messages received on Tuesdays will be addressed as soon as I am able when I am back in the office on the next business day. Urgent calls will be addressed by a covering clinician.       If lab work was done today as part of your evaluation you will generally be contacted via Anda, mail, or phone with the results within 7 days. I encourage you to sign up for [employer] ([email or web address]). If there is an alarming/concerning result we will contact you by phone. Lab results come back at varying times, I generally wait until all labs are resulted before making comments on results, but if any labs look concerning, I will reach out sooner. Please note, labs are automatically released to Anda once available, but it may take a couple of days for my interpretation note to appear.      I try very hard to respond to medical messages with 2 business days of receiving them. Occasionally it takes me longer if I am trying to figure out the best way to respond and need to seek guidance,  do some research or dig deeper into your medical history to come up with a helpful response.      If you need refills please contact your pharmacist. They will send a refill request to me to review. Please allow 3-5 business days for us to respond to all refill requests.      Please call or send a medical message with any questions or concerns. I do ask that all patients who are taking chronic medications for conditions that I am managing schedule an in-person visit with me at least once a year. Otherwise, I am happy to see you for acute concerns or other questions via in-person or virtual visit. If you are not able to get an appointment with me in the timeframe you desire, please ask to speak with one of the [facility] nurses who may be able to access a sooner appointment.      Thank you for trusting me to be part of your healthcare team!    Alejandro Bearden PA-C  [facility]    Preventive Care Advice   This is general advice given by our system to help you stay healthy. However, your care team may have specific advice just for you. Please talk to your care team about your preventive care needs.  Nutrition  Eat 5 or more servings of fruits and vegetables each day.  Try wheat bread, brown rice and whole grain pasta (instead of white bread, rice, and pasta).  Get enough calcium and vitamin D. Check the label on foods and aim for 100% of the RDA (recommended daily allowance).  Lifestyle  Exercise at least 150 minutes each week  (30 minutes a day, 5 days a week).  Do muscle strengthening activities 2 days a week. These help control your weight and prevent disease.  No smoking.  Wear sunscreen to prevent skin cancer.  Have a dental exam and cleaning every 6 months.  Yearly exams  See your health care team every year to talk about:  Any changes in your health.  Any medicines your care team has prescribed.  Preventive care, family planning, and ways to prevent chronic diseases.  Shots  (vaccines)   HPV shots (up to age 26), if you've never had them before.  Hepatitis B shots (up to age 59), if you've never had them before.  COVID-19 shot: Get this shot when it's due.  Flu shot: Get a flu shot every year.  Tetanus shot: Get a tetanus shot every 10 years.  Pneumococcal, hepatitis A, and RSV shots: Ask your care team if you need these based on your risk.  Shingles shot (for age 50 and up)  General health tests  Diabetes screening:  Starting at age 35, Get screened for diabetes at least every 3 years.  If you are younger than age 35, ask your care team if you should be screened for diabetes.  Cholesterol test: At age 39, start having a cholesterol test every 5 years, or more often if advised.  Bone density scan (DEXA): At age 50, ask your care team if you should have this scan for osteoporosis (brittle bones).  Hepatitis C: Get tested at least once in your life.  STIs (sexually transmitted infections)  Before age 24: Ask your care team if you should be screened for STIs.  After age 24: Get screened for STIs if you're at risk. You are at risk for STIs (including HIV) if:  You are sexually active with more than one person.  You don't use condoms every time.  You or a partner was diagnosed with a sexually transmitted infection.  If you are at risk for HIV, ask about PrEP medicine to prevent HIV.  Get tested for HIV at least once in your life, whether you are at risk for HIV or not.  Cancer screening tests  Cervical cancer screening: If you have a cervix, begin getting regular cervical cancer screening tests starting at age 21.  Breast cancer scan (mammogram): If you've ever had breasts, begin having regular mammograms starting at age 40. This is a scan to check for breast cancer.  Colon cancer screening: It is important to start screening for colon cancer at age 45.  Have a colonoscopy test every 10 years (or more often if you're at risk) Or, ask your provider about stool tests like a FIT test every year  or Cologuard test every 3 years.  To learn more about your testing options, visit:   .  For help making a decision, visit:   https://bit.ly/jx56204.  Prostate cancer screening test: If you have a prostate, ask your care team if a prostate cancer screening test (PSA) at age 55 is right for you.  Lung cancer screening: If you are a current or former smoker ages 50 to 80, ask your care team if ongoing lung cancer screenings are right for you.  For informational purposes only. Not to replace the advice of your health care provider. Copyright   2023 [employer]. All rights reserved. Clinically reviewed by the [facility]. "[employer] 929647 - REV 01/24.

## 2025-05-16 ENCOUNTER — RESULTS FOLLOW-UP (OUTPATIENT)
Dept: FAMILY MEDICINE | Facility: CLINIC | Age: 40
End: 2025-05-16

## 2025-05-16 DIAGNOSIS — D70.8 OTHER NEUTROPENIA: Primary | ICD-10-CM

## 2025-05-16 NOTE — RESULT ENCOUNTER NOTE
Dear Ivan,     Your white blood cell count was slightly low but otherwise your blood line was normal along with all other labs. There can be many reasons why it can be low. I'd recommend we get some follow-up testing in the next several weeks, then have a follow-up visit to discuss the results 2-3 days after your lab appointment.     You can schedule a lab appointment and follow-up video/phone appointment with me via App[employer] or by calling us at [phone].      Let me know if you have any questions!     Best Regards,   Alejandro Bearden PA-C  [facility]

## 2025-06-02 ENCOUNTER — LAB (OUTPATIENT)
Dept: LAB | Facility: CLINIC | Age: 40
End: 2025-06-02
Payer: COMMERCIAL

## 2025-06-02 ENCOUNTER — TRANSFERRED RECORDS (OUTPATIENT)
Dept: HEALTH INFORMATION MANAGEMENT | Facility: CLINIC | Age: 40
End: 2025-06-02

## 2025-06-02 DIAGNOSIS — D70.8 OTHER NEUTROPENIA: ICD-10-CM

## 2025-06-02 LAB
BASOPHILS # BLD AUTO: 0 10E3/UL (ref 0–0.2)
BASOPHILS NFR BLD AUTO: 1 %
CRP SERPL-MCNC: <3 MG/L
EOSINOPHIL # BLD AUTO: 0.1 10E3/UL (ref 0–0.7)
EOSINOPHIL NFR BLD AUTO: 1 %
ERYTHROCYTE [DISTWIDTH] IN BLOOD BY AUTOMATED COUNT: 12.1 % (ref 10–15)
FOLATE SERPL-MCNC: 16.1 NG/ML (ref 4.6–34.8)
HCT VFR BLD AUTO: 37.4 % (ref 35–47)
HGB BLD-MCNC: 12.9 G/DL (ref 11.7–15.7)
IMM GRANULOCYTES # BLD: 0 10E3/UL
IMM GRANULOCYTES NFR BLD: 0 %
LYMPHOCYTES # BLD AUTO: 1.3 10E3/UL (ref 0.8–5.3)
LYMPHOCYTES NFR BLD AUTO: 34 %
MCH RBC QN AUTO: 30.9 PG (ref 26.5–33)
MCHC RBC AUTO-ENTMCNC: 34.5 G/DL (ref 31.5–36.5)
MCV RBC AUTO: 90 FL (ref 78–100)
MONOCYTES # BLD AUTO: 0.4 10E3/UL (ref 0–1.3)
MONOCYTES NFR BLD AUTO: 11 %
NEUTROPHILS # BLD AUTO: 2 10E3/UL (ref 1.6–8.3)
NEUTROPHILS NFR BLD AUTO: 53 %
PLATELET # BLD AUTO: 174 10E3/UL (ref 150–450)
RBC # BLD AUTO: 4.17 10E6/UL (ref 3.8–5.2)
RETICS # AUTO: 0.06 10E6/UL (ref 0.03–0.1)
RETICS/RBC NFR AUTO: 1.5 % (ref 0.5–2)
VIT B12 SERPL-MCNC: 280 PG/ML (ref 232–1245)
WBC # BLD AUTO: 3.7 10E3/UL (ref 4–11)

## 2025-06-02 PROCEDURE — 99207 BLOOD MORPHOLOGY PATHOLOGIST REVIEW: CPT | Performed by: PATHOLOGY

## 2025-06-02 PROCEDURE — 36415 COLL VENOUS BLD VENIPUNCTURE: CPT

## 2025-06-02 PROCEDURE — 86140 C-REACTIVE PROTEIN: CPT

## 2025-06-02 PROCEDURE — 82746 ASSAY OF FOLIC ACID SERUM: CPT

## 2025-06-02 PROCEDURE — 85025 COMPLETE CBC W/AUTO DIFF WBC: CPT

## 2025-06-02 PROCEDURE — 82607 VITAMIN B-12: CPT

## 2025-06-02 PROCEDURE — 99000 SPECIMEN HANDLING OFFICE-LAB: CPT

## 2025-06-02 PROCEDURE — 85045 AUTOMATED RETICULOCYTE COUNT: CPT

## 2025-06-02 PROCEDURE — 82525 ASSAY OF COPPER: CPT | Mod: 90

## 2025-06-03 LAB
PATH REPORT.COMMENTS IMP SPEC: NORMAL
PATH REPORT.COMMENTS IMP SPEC: NORMAL
PATH REPORT.FINAL DX SPEC: NORMAL
PATH REPORT.MICROSCOPIC SPEC OTHER STN: NORMAL
PATH REPORT.MICROSCOPIC SPEC OTHER STN: NORMAL
PATH REPORT.RELEVANT HX SPEC: NORMAL

## 2025-06-04 LAB — COPPER SERPL-MCNC: 78.4 UG/DL

## 2025-06-09 ENCOUNTER — THERAPY VISIT (OUTPATIENT)
Dept: PHYSICAL THERAPY | Facility: CLINIC | Age: 40
End: 2025-06-09
Payer: COMMERCIAL

## 2025-06-09 DIAGNOSIS — M25.569 KNEE PAIN: ICD-10-CM

## 2025-06-09 PROCEDURE — 97530 THERAPEUTIC ACTIVITIES: CPT | Mod: GP | Performed by: PHYSICAL THERAPIST

## 2025-06-09 PROCEDURE — 97161 PT EVAL LOW COMPLEX 20 MIN: CPT | Mod: GP | Performed by: PHYSICAL THERAPIST

## 2025-06-09 PROCEDURE — 97110 THERAPEUTIC EXERCISES: CPT | Mod: GP | Performed by: PHYSICAL THERAPIST

## 2025-06-09 ASSESSMENT — ACTIVITIES OF DAILY LIVING (ADL)
STAND: ACTIVITY IS NOT DIFFICULT
AS_A_RESULT_OF_YOUR_KNEE_INJURY,_HOW_WOULD_YOU_RATE_YOUR_CURRENT_LEVEL_OF_DAILY_ACTIVITY?: NEARLY NORMAL
STIFFNESS: THE SYMPTOM AFFECTS MY ACTIVITY SLIGHTLY
GO UP STAIRS: ACTIVITY IS NOT DIFFICULT
SWELLING: I DO NOT HAVE THE SYMPTOM
KNEE_ACTIVITY_OF_DAILY_LIVING_SUM: 64
KNEEL ON THE FRONT OF YOUR KNEE: ACTIVITY IS NOT DIFFICULT
HOW_WOULD_YOU_RATE_THE_CURRENT_FUNCTION_OF_YOUR_KNEE_DURING_YOUR_USUAL_DAILY_ACTIVITIES_ON_A_SCALE_FROM_0_TO_100_WITH_100_BEING_YOUR_LEVEL_OF_KNEE_FUNCTION_PRIOR_TO_YOUR_INJURY_AND_0_BEING_THE_INABILITY_TO_PERFORM_ANY_OF_YOUR_USUAL_DAILY_ACTIVITIES?: 90
AS_A_RESULT_OF_YOUR_KNEE_INJURY,_HOW_WOULD_YOU_RATE_YOUR_CURRENT_LEVEL_OF_DAILY_ACTIVITY?: NEARLY NORMAL
GO DOWN STAIRS: ACTIVITY IS NOT DIFFICULT
PAIN: THE SYMPTOM AFFECTS MY ACTIVITY SLIGHTLY
WALK: ACTIVITY IS NOT DIFFICULT
SQUAT: ACTIVITY IS NOT DIFFICULT
PAIN: THE SYMPTOM AFFECTS MY ACTIVITY SLIGHTLY
GO DOWN STAIRS: ACTIVITY IS NOT DIFFICULT
RISE FROM A CHAIR: ACTIVITY IS NOT DIFFICULT
KNEEL ON THE FRONT OF YOUR KNEE: ACTIVITY IS NOT DIFFICULT
WEAKNESS: I DO NOT HAVE THE SYMPTOM
GIVING WAY, BUCKLING OR SHIFTING OF KNEE: THE SYMPTOM AFFECTS MY ACTIVITY SLIGHTLY
HOW_WOULD_YOU_RATE_THE_OVERALL_FUNCTION_OF_YOUR_KNEE_DURING_YOUR_USUAL_DAILY_ACTIVITIES?: NORMAL
WEAKNESS: I DO NOT HAVE THE SYMPTOM
WALK: ACTIVITY IS NOT DIFFICULT
GIVING WAY, BUCKLING OR SHIFTING OF KNEE: THE SYMPTOM AFFECTS MY ACTIVITY SLIGHTLY
KNEE_ACTIVITY_OF_DAILY_LIVING_SCORE: 91.43
LIMPING: I DO NOT HAVE THE SYMPTOM
HOW_WOULD_YOU_RATE_THE_CURRENT_FUNCTION_OF_YOUR_KNEE_DURING_YOUR_USUAL_DAILY_ACTIVITIES_ON_A_SCALE_FROM_0_TO_100_WITH_100_BEING_YOUR_LEVEL_OF_KNEE_FUNCTION_PRIOR_TO_YOUR_INJURY_AND_0_BEING_THE_INABILITY_TO_PERFORM_ANY_OF_YOUR_USUAL_DAILY_ACTIVITIES?: 90
HOW_WOULD_YOU_RATE_THE_OVERALL_FUNCTION_OF_YOUR_KNEE_DURING_YOUR_USUAL_DAILY_ACTIVITIES?: NORMAL
SWELLING: I DO NOT HAVE THE SYMPTOM
LIMPING: I DO NOT HAVE THE SYMPTOM
STIFFNESS: THE SYMPTOM AFFECTS MY ACTIVITY SLIGHTLY
RAW_SCORE: 64
PLEASE_INDICATE_YOR_PRIMARY_REASON_FOR_REFERRAL_TO_THERAPY:: KNEE
STAND: ACTIVITY IS NOT DIFFICULT
SIT WITH YOUR KNEE BENT: ACTIVITY IS NOT DIFFICULT
RISE FROM A CHAIR: ACTIVITY IS NOT DIFFICULT
SIT WITH YOUR KNEE BENT: ACTIVITY IS NOT DIFFICULT
SQUAT: ACTIVITY IS NOT DIFFICULT
GO UP STAIRS: ACTIVITY IS NOT DIFFICULT

## 2025-06-09 NOTE — PROGRESS NOTES
"PHYSICAL THERAPY EVALUATION  Type of Visit: Evaluation       Fall Risk Screen:  Have you fallen 2 or more times in the past year?: No  Have you fallen and had an injury in the past year?: No    Subjective   KEY PT FINDINGS:  1) Asymmetric ROM in RITU on the left  2) Mild decreased soleus ROM and decreased hamstring flexibility   3) Full knee ROM, no effusion    Physical Therapy Initial Evaluation: Subjective History     Injury/Condition Details:  Presenting Complaint Right knee symptoms - awareness, soreness   Onset Timing/Date 6/6/2025   Mechanism Signed up for  10 mile. Has been running 2x/week through the winter.   Has been running 1-2x/week, feeling the knee more.   Intermittent, unpredictable awareness. It has never been really bad.   Did 6 w/o issue. Has felt it on inclines.   Feels like she has to be aware of her form.   The \"sensation\" does not get worse with increased time/mileage.     Lifting is going well. Has no issues with lifting in her running spot, occasional outside of the knee pain. Has been consistent \"I know I need it\".      Symptom Behavior Details    Primary Symptoms Sporadic symptoms; Activity/position dependent, pain (Location: anterior knee, medial more than lateral, Quality: Aching/Throbbing), stiffness  Denies swelling. Does report stiffness after running, later throughout the day.    Worst Pain 2/10 (with see below)   Symptom Provocators Running and occasionally while moving around later the day - but this isn't predictable.   No issues on stairs after a run   Squatting feels fine.    Best Pain 0/10    Symptom Relievers Scrapping   Time of day dependent? No   Recent symptom change? no change in symptoms     Prior Testing/Intervention for current condition:  Prior Tests None   Prior Treatment PT      Lifestyle & General Medical History:  Employment Health    Usual physical activities  (within past year) See above   Orthopaedic history See Epic Chart   Notable medical history See " Epic Chart   Patient Reported Health good           Presenting condition or subjective complaint: running injury  Date of onset: 06/09/25    Relevant medical history:     Dates & types of surgery:      Prior diagnostic imaging/testing results:       Prior therapy history for the same diagnosis, illness or injury: Yes dry needling    Living Environment  Social support: With family members   Type of home: House   Stairs to enter the home: Yes 4 Is there a railing: Yes     Ramp: No   Stairs inside the home: Yes 15 Is there a railing: Yes     Help at home: None  Equipment owned:       Employment: Yes health   Hobbies/Interests: running gardening    Patient goals for therapy: run without pain       Objective       Lower Extremity Physical Therapy Examination    Dynamic Movement Screen:  2 leg stance: equal weight bearing, neutral appearing alignment  2 leg squat:Normal    1 leg stance:   Right: proprioceptive challenge  Left: proprioceptive challenge    1 leg squat: preferred position is leg in front  Right: proprioceptive challenge  Left: proprioceptive challenge    Gait: Normal    Knee Joint ROM: full, symmetric            Hip Joint ROM   ER IR   Right 67 deg 65 deg   Left 37 deg 37 deg     Lower Extremity Muscle Strength (x/5): 5/5 bilaterally    Basic Muscle Activation:  Transversus Abdominus: NEXT  Quadriceps: Right: Normal, Left: Normal    Knee Joint Effusion (Stroke Test Assessment):  Right: None  Left: None    Lower Extremity Flexibility Screen:  Hamstrings (Supine SLR): Right: +; Left: -  Gastroc (Supine Active DF): Right: -; Left: -, knee>wall 13.5cm left, 11cm right  Quadriceps (Prone KF): Right: ++; Left: -  Hip Flexors (Storm Test): Right: NT; Left: NT  ITB (Minerva Test): Right: NT; Left: NT  RITU: decreased ROM on the right     Palpation:   Tender to palpation at the following structures: None  NOT tender to palpation at the following structures: Patellar boarders, medial and lateral  joint      Assessment & Plan   CLINICAL IMPRESSIONS  Medical Diagnosis: Right knee    Treatment Diagnosis: Right knee   Impression/Assessment: Patient is a 40 year old female with right knee complaints.  The following significant findings have been identified: Pain, Decreased ROM/flexibility, Decreased proprioception, and Decreased activity tolerance. These impairments interfere with their ability to perform recreational activities and community mobility as compared to previous level of function.     Clinical Decision Making (Complexity):  Clinical Presentation: Stable/Uncomplicated  Clinical Presentation Rationale: based on medical and personal factors listed in PT evaluation  Clinical Decision Making (Complexity): Low complexity    PLAN OF CARE  Treatment Interventions:  Interventions: Manual Therapy, Neuromuscular Re-education, Therapeutic Activity, Therapeutic Exercise    Long Term Goals     PT Goal 1  Goal Identifier: Running  Goal Description: Patient will participate in 10 mile training plan without increasing knee symptoms  Rationale: to maximize safety and independence within the community  Target Date: 09/01/25      Frequency of Treatment: 1x/week  Duration of Treatment: 3 months    Recommended Referrals to Other Professionals: Physical Therapy  Education Assessment:   Learner/Method: Patient;No Barriers to Learning    Risks and benefits of evaluation/treatment have been explained.   Patient/Family/caregiver agrees with Plan of Care.     Evaluation Time:     PT Eval, Low Complexity Minutes (70137): 20     Signing Clinician: Kimberli Dodge PT

## 2025-07-28 ENCOUNTER — MYC MEDICAL ADVICE (OUTPATIENT)
Dept: FAMILY MEDICINE | Facility: CLINIC | Age: 40
End: 2025-07-28
Payer: COMMERCIAL

## 2025-07-29 NOTE — TELEPHONE ENCOUNTER
Alejandro - see Renee, would you advise patient do follow up labs sooner? Appreciate review.    KALYAN Bryan, BSN, PHN, AMB-BC (she/her)  [facility] RN

## 2025-07-31 NOTE — TELEPHONE ENCOUNTER
Writer responded via [employer].  DELMA DacostaN, RN-BC  [facility]

## 2025-08-06 ENCOUNTER — OFFICE VISIT (OUTPATIENT)
Dept: FAMILY MEDICINE | Facility: CLINIC | Age: 40
End: 2025-08-06
Payer: COMMERCIAL

## 2025-08-06 VITALS
TEMPERATURE: 97.8 F | RESPIRATION RATE: 20 BRPM | WEIGHT: 160 LBS | BODY MASS INDEX: 21.67 KG/M2 | HEIGHT: 72 IN | SYSTOLIC BLOOD PRESSURE: 122 MMHG | HEART RATE: 104 BPM | DIASTOLIC BLOOD PRESSURE: 76 MMHG | OXYGEN SATURATION: 100 %

## 2025-08-06 DIAGNOSIS — M79.10 MYALGIA: ICD-10-CM

## 2025-08-06 DIAGNOSIS — R79.0 LOW SERUM COPPER FOR AGE: ICD-10-CM

## 2025-08-06 DIAGNOSIS — R53.83 OTHER FATIGUE: Primary | ICD-10-CM

## 2025-08-06 DIAGNOSIS — D70.9 NEUTROPENIA, UNSPECIFIED TYPE: ICD-10-CM

## 2025-08-06 LAB
ALBUMIN SERPL BCG-MCNC: 4.9 G/DL (ref 3.5–5.2)
ALBUMIN UR-MCNC: NEGATIVE MG/DL
ALP SERPL-CCNC: 49 U/L (ref 40–150)
ALT SERPL W P-5'-P-CCNC: 10 U/L (ref 0–50)
ANION GAP SERPL CALCULATED.3IONS-SCNC: 13 MMOL/L (ref 7–15)
APPEARANCE UR: CLEAR
AST SERPL W P-5'-P-CCNC: 22 U/L (ref 0–45)
BASOPHILS # BLD AUTO: 0 10E3/UL (ref 0–0.2)
BASOPHILS NFR BLD AUTO: 0 %
BILIRUB SERPL-MCNC: 0.5 MG/DL
BILIRUB UR QL STRIP: NEGATIVE
BUN SERPL-MCNC: 15.2 MG/DL (ref 6–20)
CALCIUM SERPL-MCNC: 9.8 MG/DL (ref 8.8–10.4)
CHLORIDE SERPL-SCNC: 103 MMOL/L (ref 98–107)
CK SERPL-CCNC: 67 U/L (ref 26–192)
COLOR UR AUTO: YELLOW
CREAT SERPL-MCNC: 0.86 MG/DL (ref 0.51–0.95)
EGFRCR SERPLBLD CKD-EPI 2021: 88 ML/MIN/1.73M2
EOSINOPHIL # BLD AUTO: 0.1 10E3/UL (ref 0–0.7)
EOSINOPHIL NFR BLD AUTO: 1 %
ERYTHROCYTE [DISTWIDTH] IN BLOOD BY AUTOMATED COUNT: 12.4 % (ref 10–15)
FERRITIN SERPL-MCNC: 103 NG/ML (ref 6–175)
GLUCOSE SERPL-MCNC: 100 MG/DL (ref 70–99)
GLUCOSE UR STRIP-MCNC: NEGATIVE MG/DL
HCO3 SERPL-SCNC: 26 MMOL/L (ref 22–29)
HCT VFR BLD AUTO: 37.4 % (ref 35–47)
HGB BLD-MCNC: 13.1 G/DL (ref 11.7–15.7)
HGB UR QL STRIP: ABNORMAL
IMM GRANULOCYTES # BLD: 0 10E3/UL
IMM GRANULOCYTES NFR BLD: 0 %
KETONES UR STRIP-MCNC: NEGATIVE MG/DL
LEUKOCYTE ESTERASE UR QL STRIP: NEGATIVE
LYMPHOCYTES # BLD AUTO: 1.7 10E3/UL (ref 0.8–5.3)
LYMPHOCYTES NFR BLD AUTO: 30 %
MCH RBC QN AUTO: 31.7 PG (ref 26.5–33)
MCHC RBC AUTO-ENTMCNC: 35 G/DL (ref 31.5–36.5)
MCV RBC AUTO: 91 FL (ref 78–100)
MONOCYTES # BLD AUTO: 0.4 10E3/UL (ref 0–1.3)
MONOCYTES NFR BLD AUTO: 7 %
NEUTROPHILS # BLD AUTO: 3.5 10E3/UL (ref 1.6–8.3)
NEUTROPHILS NFR BLD AUTO: 62 %
NITRATE UR QL: NEGATIVE
PH UR STRIP: 7 [PH] (ref 5–7)
PLATELET # BLD AUTO: 196 10E3/UL (ref 150–450)
POTASSIUM SERPL-SCNC: 3.9 MMOL/L (ref 3.4–5.3)
PROT SERPL-MCNC: 7.4 G/DL (ref 6.4–8.3)
RBC # BLD AUTO: 4.13 10E6/UL (ref 3.8–5.2)
RBC #/AREA URNS AUTO: ABNORMAL /HPF
RETICS # AUTO: 0.09 10E6/UL (ref 0.03–0.1)
RETICS/RBC NFR AUTO: 2.1 % (ref 0.5–2)
SODIUM SERPL-SCNC: 142 MMOL/L (ref 135–145)
SP GR UR STRIP: 1.01 (ref 1–1.03)
TSH SERPL DL<=0.005 MIU/L-ACNC: 1.18 UIU/ML (ref 0.3–4.2)
UROBILINOGEN UR STRIP-ACNC: 0.2 E.U./DL
WBC # BLD AUTO: 5.7 10E3/UL (ref 4–11)
WBC #/AREA URNS AUTO: ABNORMAL /HPF

## 2025-08-06 PROCEDURE — 80053 COMPREHEN METABOLIC PANEL: CPT | Performed by: STUDENT IN AN ORGANIZED HEALTH CARE EDUCATION/TRAINING PROGRAM

## 2025-08-06 PROCEDURE — 99214 OFFICE O/P EST MOD 30 MIN: CPT | Performed by: STUDENT IN AN ORGANIZED HEALTH CARE EDUCATION/TRAINING PROGRAM

## 2025-08-06 PROCEDURE — 84443 ASSAY THYROID STIM HORMONE: CPT | Performed by: STUDENT IN AN ORGANIZED HEALTH CARE EDUCATION/TRAINING PROGRAM

## 2025-08-06 PROCEDURE — G2211 COMPLEX E/M VISIT ADD ON: HCPCS | Performed by: STUDENT IN AN ORGANIZED HEALTH CARE EDUCATION/TRAINING PROGRAM

## 2025-08-06 PROCEDURE — 3078F DIAST BP <80 MM HG: CPT | Performed by: STUDENT IN AN ORGANIZED HEALTH CARE EDUCATION/TRAINING PROGRAM

## 2025-08-06 PROCEDURE — 99207 BLOOD MORPHOLOGY PATHOLOGIST REVIEW: CPT | Performed by: PATHOLOGY

## 2025-08-06 PROCEDURE — 82525 ASSAY OF COPPER: CPT | Mod: 90 | Performed by: STUDENT IN AN ORGANIZED HEALTH CARE EDUCATION/TRAINING PROGRAM

## 2025-08-06 PROCEDURE — 85045 AUTOMATED RETICULOCYTE COUNT: CPT | Performed by: STUDENT IN AN ORGANIZED HEALTH CARE EDUCATION/TRAINING PROGRAM

## 2025-08-06 PROCEDURE — 85025 COMPLETE CBC W/AUTO DIFF WBC: CPT | Performed by: STUDENT IN AN ORGANIZED HEALTH CARE EDUCATION/TRAINING PROGRAM

## 2025-08-06 PROCEDURE — 82728 ASSAY OF FERRITIN: CPT | Performed by: STUDENT IN AN ORGANIZED HEALTH CARE EDUCATION/TRAINING PROGRAM

## 2025-08-06 PROCEDURE — 82550 ASSAY OF CK (CPK): CPT | Performed by: STUDENT IN AN ORGANIZED HEALTH CARE EDUCATION/TRAINING PROGRAM

## 2025-08-06 PROCEDURE — 36415 COLL VENOUS BLD VENIPUNCTURE: CPT | Performed by: STUDENT IN AN ORGANIZED HEALTH CARE EDUCATION/TRAINING PROGRAM

## 2025-08-06 PROCEDURE — 3074F SYST BP LT 130 MM HG: CPT | Performed by: STUDENT IN AN ORGANIZED HEALTH CARE EDUCATION/TRAINING PROGRAM

## 2025-08-06 PROCEDURE — 99000 SPECIMEN HANDLING OFFICE-LAB: CPT | Performed by: STUDENT IN AN ORGANIZED HEALTH CARE EDUCATION/TRAINING PROGRAM

## 2025-08-06 PROCEDURE — 1126F AMNT PAIN NOTED NONE PRSNT: CPT | Performed by: STUDENT IN AN ORGANIZED HEALTH CARE EDUCATION/TRAINING PROGRAM

## 2025-08-06 PROCEDURE — 81001 URINALYSIS AUTO W/SCOPE: CPT | Performed by: STUDENT IN AN ORGANIZED HEALTH CARE EDUCATION/TRAINING PROGRAM

## 2025-08-06 ASSESSMENT — PAIN SCALES - GENERAL: PAINLEVEL_OUTOF10: NO PAIN (0)

## 2025-08-07 LAB
PATH REPORT.COMMENTS IMP SPEC: NORMAL
PATH REPORT.FINAL DX SPEC: NORMAL
PATH REPORT.MICROSCOPIC SPEC OTHER STN: NORMAL
PATH REPORT.MICROSCOPIC SPEC OTHER STN: NORMAL

## (undated) DEVICE — SUCTION CANISTER MEDIVAC LINER 3000ML W/LID 65651-530

## (undated) DEVICE — DRSG TEGADERM 4X10" 1627

## (undated) DEVICE — SOL NACL 0.9% IRRIG 1000ML BOTTLE 07138-09

## (undated) DEVICE — PREP CHLORAPREP 26ML TINTED ORANGE  260815

## (undated) DEVICE — SU VICRYL 3-0 CT-1 36" J344H

## (undated) DEVICE — SU MONOCRYL 0 CTX 36" Y398H

## (undated) DEVICE — SU PDS II 0 CT 36" Z358T

## (undated) DEVICE — ESU GROUND PAD UNIVERSAL W/O CORD

## (undated) DEVICE — CATH TRAY FOLEY 16FR BARDEX W/DRAIN BAG STATLOCK 300316A

## (undated) DEVICE — PACK C-SECTION LF PL15OTA83B

## (undated) DEVICE — STPL SKIN SUBCUTICULAR INSORB  2030

## (undated) DEVICE — GLOVE PROTEXIS W/NEU-THERA 7.5  2D73TE75

## (undated) DEVICE — LINEN C-SECTION 5415

## (undated) DEVICE — SU PLAIN 2-0 CT 27" 853H

## (undated) DEVICE — BLADE CLIPPER 4406

## (undated) RX ORDER — FENTANYL CITRATE 50 UG/ML
INJECTION, SOLUTION INTRAMUSCULAR; INTRAVENOUS
Status: DISPENSED
Start: 2020-05-05

## (undated) RX ORDER — OXYTOCIN/0.9 % SODIUM CHLORIDE 30/500 ML
PLASTIC BAG, INJECTION (ML) INTRAVENOUS
Status: DISPENSED
Start: 2020-05-05

## (undated) RX ORDER — MORPHINE SULFATE 1 MG/ML
INJECTION, SOLUTION EPIDURAL; INTRATHECAL; INTRAVENOUS
Status: DISPENSED
Start: 2020-05-05

## (undated) RX ORDER — ONDANSETRON 2 MG/ML
INJECTION INTRAMUSCULAR; INTRAVENOUS
Status: DISPENSED
Start: 2020-05-05